# Patient Record
Sex: MALE | Race: WHITE | NOT HISPANIC OR LATINO | Employment: FULL TIME | URBAN - METROPOLITAN AREA
[De-identification: names, ages, dates, MRNs, and addresses within clinical notes are randomized per-mention and may not be internally consistent; named-entity substitution may affect disease eponyms.]

---

## 2017-03-02 ENCOUNTER — TRANSFERRED RECORDS (OUTPATIENT)
Dept: MULTI SPECIALTY CLINIC | Facility: CLINIC | Age: 57
End: 2017-03-02

## 2017-03-02 LAB
CHOLEST SERPL-MCNC: 218 MG/DL (ref 0–199)
CREAT SERPL-MCNC: 1.03 MG/DL (ref 0.73–1.18)
GFR SERPL CREATININE-BSD FRML MDRD: >60 ML/MIN/1.73M2
GLUCOSE SERPL-MCNC: 94 MG/DL (ref 70–100)
HDLC SERPL-MCNC: 51 MG/DL
LDLC SERPL CALC-MCNC: 149 MG/DL (ref 19–130)
POTASSIUM SERPL-SCNC: 4.2 MMOL/L (ref 3.5–5.2)
TRIGL SERPL-MCNC: 92 MG/DL (ref 4–149)

## 2017-08-07 ENCOUNTER — TRANSFERRED RECORDS (OUTPATIENT)
Dept: MULTI SPECIALTY CLINIC | Facility: CLINIC | Age: 57
End: 2017-08-07

## 2020-01-20 ENCOUNTER — OFFICE VISIT (OUTPATIENT)
Dept: FAMILY MEDICINE | Facility: CLINIC | Age: 60
End: 2020-01-20
Payer: COMMERCIAL

## 2020-01-20 ENCOUNTER — TELEPHONE (OUTPATIENT)
Dept: FAMILY MEDICINE | Facility: CLINIC | Age: 60
End: 2020-01-20

## 2020-01-20 VITALS
WEIGHT: 214 LBS | HEIGHT: 71 IN | BODY MASS INDEX: 29.96 KG/M2 | SYSTOLIC BLOOD PRESSURE: 190 MMHG | OXYGEN SATURATION: 97 % | TEMPERATURE: 97.7 F | DIASTOLIC BLOOD PRESSURE: 112 MMHG | HEART RATE: 86 BPM

## 2020-01-20 DIAGNOSIS — Z13.1 SCREENING FOR DIABETES MELLITUS: ICD-10-CM

## 2020-01-20 DIAGNOSIS — Z13.220 SCREENING FOR LIPID DISORDERS: ICD-10-CM

## 2020-01-20 DIAGNOSIS — I10 ESSENTIAL HYPERTENSION: Primary | ICD-10-CM

## 2020-01-20 DIAGNOSIS — I10 ESSENTIAL HYPERTENSION: ICD-10-CM

## 2020-01-20 DIAGNOSIS — Z23 NEED FOR PROPHYLACTIC VACCINATION AND INOCULATION AGAINST INFLUENZA: ICD-10-CM

## 2020-01-20 DIAGNOSIS — Z12.5 SCREENING FOR PROSTATE CANCER: ICD-10-CM

## 2020-01-20 DIAGNOSIS — Z00.00 ROUTINE GENERAL MEDICAL EXAMINATION AT A HEALTH CARE FACILITY: Primary | ICD-10-CM

## 2020-01-20 PROCEDURE — 99213 OFFICE O/P EST LOW 20 MIN: CPT | Mod: 25 | Performed by: NURSE PRACTITIONER

## 2020-01-20 PROCEDURE — 90682 RIV4 VACC RECOMBINANT DNA IM: CPT | Performed by: NURSE PRACTITIONER

## 2020-01-20 PROCEDURE — 90471 IMMUNIZATION ADMIN: CPT | Performed by: NURSE PRACTITIONER

## 2020-01-20 PROCEDURE — 99386 PREV VISIT NEW AGE 40-64: CPT | Mod: 25 | Performed by: NURSE PRACTITIONER

## 2020-01-20 RX ORDER — LOSARTAN POTASSIUM AND HYDROCHLOROTHIAZIDE 12.5; 5 MG/1; MG/1
1 TABLET ORAL DAILY
Qty: 60 TABLET | Refills: 1 | Status: SHIPPED | OUTPATIENT
Start: 2020-01-20 | End: 2020-01-20 | Stop reason: ALTCHOICE

## 2020-01-20 RX ORDER — HYDROCHLOROTHIAZIDE 25 MG/1
25 TABLET ORAL DAILY
Qty: 30 TABLET | Refills: 1 | Status: SHIPPED | OUTPATIENT
Start: 2020-01-20 | End: 2020-01-23

## 2020-01-20 RX ORDER — LOSARTAN POTASSIUM 100 MG/1
100 TABLET ORAL DAILY
Qty: 30 TABLET | Refills: 1 | Status: SHIPPED | OUTPATIENT
Start: 2020-01-20 | End: 2020-01-23

## 2020-01-20 ASSESSMENT — MIFFLIN-ST. JEOR: SCORE: 1799.89

## 2020-01-20 NOTE — TELEPHONE ENCOUNTER
Ordered separately. Ordered higher dose (100 mg and 25 mg). Please break in half for the first few days and follow our plan (increase to full dose after a few days and call if still over 140 / 90).     Thanks.    Darien

## 2020-01-20 NOTE — TELEPHONE ENCOUNTER
S/w pt and advised of Darien's reply below.  Pt states [facility] pharmacy may not be in network and is researching that now.  Advised if [facility] pharmacy cannot be used he may want to call around to see who has the medications.  If another pharmacy has the medication advised pt the rxs can be transferred between pharmacies without involving the clinic.  The new pharmacy would have to call [facility] to transfer the rxs.     Pt states understanding.    Elizabeth MYLES RN  EP Triage

## 2020-01-20 NOTE — TELEPHONE ENCOUNTER
Pt called and states the pharmacy is not able to fill the losartan-hydrochlorothiazide 50-12.5 mg.  Will need 2 separate rxs but pharmacy is not able to fill losartan rx.    S/w Silvio pharmacist at [facility] in [facility] who states all losartan is on back order due to the recall of hyzaar and will be out of the medication by end of day of what they have.    [facility] in [address] just received a shipment of losartan today.  Pt is willing to go there for rx or do you want to change rx?    If doing 2 separate rxs do you want to do higher (double dosing now and pt take 1/2 tab for a couple of days) or stay with the prescribed dosing?    Pharmacy pended.    Pt can be reached at [phone].    Elizabeth MYLES RN  EP Triage

## 2020-01-20 NOTE — PATIENT INSTRUCTIONS
Preventive Health Recommendations  Male Ages 50 - 64    Yearly exam:             See your health care provider every year in order to  o   Review health changes.   o   Discuss preventive care.    o   Review your medicines if your doctor has prescribed any.     Have a cholesterol test every 5 years, or more frequently if you are at risk for high cholesterol/heart disease.     Have a diabetes test (fasting glucose) every three years. If you are at risk for diabetes, you should have this test more often.     Have a colonoscopy at age 50, or have a yearly FIT test (stool test). These exams will check for colon cancer.      Talk with your health care provider about whether or not a prostate cancer screening test (PSA) is right for you.    You should be tested each year for STDs (sexually transmitted diseases), if you re at risk.     Shots: Get a flu shot each year. Get a tetanus shot every 10 years.     Nutrition:    Eat at least 5 servings of fruits and vegetables daily.     Eat whole-grain bread, whole-wheat pasta and brown rice instead of white grains and rice.     Get adequate Calcium and Vitamin D.     Lifestyle    Exercise for at least 150 minutes a week (30 minutes a day, 5 days a week). This will help you control your weight and prevent disease.     Limit alcohol to one drink per day.     No smoking.     Wear sunscreen to prevent skin cancer.     See your dentist every six months for an exam and cleaning.     See your eye doctor every 1 to 2 years.    1. Start losartan-hydrochlorothiazide today. Take one a day for a few days. Move to 2 a day if over 140 / 90.   2. Call after a week or so if still over 140 / 90, and I'll add another agent.   3. Schedule nurse only BP check (possibly at the same time as your labs).

## 2020-01-20 NOTE — PROGRESS NOTES
"  3  SUBJECTIVE:   CC: Serg De Santiago is an 59 year old male who presents for preventive health visit.     Healthy Habits:    Do you get at least three servings of calcium containing foods daily (dairy, green leafy vegetables, etc.)? yes    Amount of exercise or daily activities, outside of work: 0 day(s) per week    Problems taking medications regularly not applicable    Medication side effects: No    Have you had an eye exam in the past two years? no    Do you see a dentist twice per year? yes    Do you have sleep apnea, excessive snoring or daytime drowsiness? yes  Colonoscopy done on this date: 8/7/17 (approximately), by this group: [facility] , results were normal .     PROBLEMS TO ADD ON... switched Ins. Co's - is from [facility] - notes he has had HTN for several years. Has not been on Medications for it, tried life style modifications that he admits he did not see through.   Right ear concerns per audiologist    1. Has been told on numerous occasions that he has high blood pressure. Has some headaches but is otherwise asymptomatic. He has a cuff at home and gets 190s over 110s routinely. Has tried lifestyle modifications to address this, but these have not been effective. Still uses tobacco. Works in furniture sales. Has been told his lipid labs were \"borderline\" in the past. Is trying to work on exercise and diet (with the help of his wife).     Today's PHQ-2 Score:   PHQ-2 ( 1999 Pfizer) 1/20/2020   Q1: Little interest or pleasure in doing things 0   Q2: Feeling down, depressed or hopeless 0   PHQ-2 Score 0       Abuse: Current or Past(Physical, Sexual or Emotional)- NO  Do you feel safe in your environment? YES    Have you ever done Advance Care Planning? (For example, a Health Directive, POLST, or a discussion with a medical provider or your loved ones about your wishes): Yes, patient states has an Advance Care Planning document and will bring a copy to the clinic.    Social History     Tobacco " "Use     Smoking status: Current Every Day Smoker     Types: Vaping Device     Smokeless tobacco: Current User     Types: Chew     Tobacco comment: 0.6 nicotine    Substance Use Topics     Alcohol use: Yes     If you drink alcohol do you typically have >3 drinks per day or >7 drinks per week? No                      Last PSA: No results found for: PSA    Reviewed orders with patient. Reviewed health maintenance and updated orders accordingly - Yes  Lab work is in process    Reviewed and updated as needed this visit by clinical staff  Tobacco  Allergies  Meds  Problems  Med Hx  Surg Hx  Fam Hx  Soc Hx          Reviewed and updated as needed this visit by Provider  Tobacco  Allergies  Meds  Problems  Med Hx  Surg Hx  Fam Hx            ROS:  CONSTITUTIONAL: NEGATIVE for fever, chills, change in weight  INTEGUMENTARY/SKIN: NEGATIVE for worrisome rashes, moles or lesions  EYES: NEGATIVE for vision changes or irritation  ENT: NEGATIVE for ear, mouth and throat problems  RESP: NEGATIVE for significant cough or SOB  CV: NEGATIVE for chest pain, palpitations or peripheral edema  GI: NEGATIVE for nausea, abdominal pain, heartburn, or change in bowel habits   male: negative for dysuria, hematuria, decreased urinary stream, erectile dysfunction, urethral discharge  MUSCULOSKELETAL: NEGATIVE for significant arthralgias or myalgia  NEURO: NEGATIVE for weakness, dizziness or paresthesias  PSYCHIATRIC: NEGATIVE for changes in mood or affect    OBJECTIVE:   BP (!) 190/112   Pulse 86   Temp 97.7  F (36.5  C) (Tympanic)   Ht 1.791 m (5' 10.5\")   Wt 97.1 kg (214 lb)   SpO2 97%   BMI 30.27 kg/m    EXAM:  GENERAL: healthy, alert and no distress  EYES: Eyes grossly normal to inspection, PERRL and conjunctivae and sclerae normal  HENT: ear canals and TM's normal, nose and mouth without ulcers or lesions  NECK: no adenopathy, no asymmetry, masses, or scars and thyroid normal to palpation  RESP: lungs clear to " auscultation - no rales, rhonchi or wheezes  CV: regular rate and rhythm, normal S1 S2, no S3 or S4, no murmur, click or rub, no peripheral edema and peripheral pulses strong  ABDOMEN: soft, nontender, no hepatosplenomegaly, no masses and bowel sounds normal  MS: no gross musculoskeletal defects noted, no edema  SKIN: no suspicious lesions or rashes  NEURO: Normal strength and tone, mentation intact and speech normal  PSYCH: mentation appears normal, affect normal/bright    Diagnostic Test Results:  No results found for this or any previous visit (from the past 24 hour(s)).    ASSESSMENT/PLAN:   Serg was seen today for physical and imm/inj.    Diagnoses and all orders for this visit:    Routine general medical examination at a health care facility  Comment: BP very high x 2 today. With the exception of this issue, he feels well. Will screen lipids and fasting BG today, as well as PSA (no family history of prostate cancer). I will follow up with him after I see these results.     Essential hypertension  Comment: BP quite elevated. He gets some headaches but is otherwise without concerning symptoms. Will order dual agent therapy initially given degree of elevation. He will begin using (and titrating) Hyzaar this week and call if BP is still out of range, at which point I will likely add another agent. He will then return for fasting labs, HTN lab, and nurse only BP check. Will consider referral to cardiology if we are unable to get this into range with 4 or fewer agents. He agrees with this approach.   -     Albumin Random Urine Quantitative with Creat Ratio; Future  -     Basic metabolic panel; Future  -     losartan-hydrochlorothiazide (HYZAAR) 50-12.5 MG tablet; Take 1 tablet by mouth daily    Screening for prostate cancer  -     Prostate spec antigen screen; Future    Screening for diabetes mellitus  -     Basic metabolic panel; Future    Screening for lipid disorders  -     Lipid panel reflex to direct LDL  "Fasting; Future    Need for prophylactic vaccination and inoculation against influenza  -     C RIV4 (FLUBLOK) VACCINE RECOMBINANT DNA PRSRV ANTIBIO FREE, IM [06281]  -     Vaccine Administration, Initial [99383]        COUNSELING:  Reviewed preventive health counseling, as reflected in patient instructions    Estimated body mass index is 30.27 kg/m  as calculated from the following:    Height as of this encounter: 1.791 m (5' 10.5\").    Weight as of this encounter: 97.1 kg (214 lb).    Weight management plan: Discussed healthy diet and exercise guidelines     reports that he has been smoking vaping device. His smokeless tobacco use includes chew.  Tobacco Cessation Action Plan: Information offered: Patient not interested at this time    Counseling Resources:  ATP IV Guidelines  Pooled Cohorts Equation Calculator  FRAX Risk Assessment  ICSI Preventive Guidelines  Dietary Guidelines for Americans, 2010  USDA's MyPlate  ASA Prophylaxis  Lung CA Screening    Reinaldo Gastelum NP  [facility]  "

## 2020-01-20 NOTE — Clinical Note
Please abstract the following data from this visit with this patient into the appropriate field in Epic:Colonoscopy done on this date: 8/7/17 (approximately), by [facility] group: [facility] , results were normal . Tests that can be patient reported without a hard copy:Colonoscopy done on this date: Colonoscopy done on this date: 8/7/17 (approximately), by this group: [facility] , results were normal .  Note to Abstraction: If this section is blank, no results were found via Chart Review/Care Everywhere.

## 2020-01-23 ENCOUNTER — TELEPHONE (OUTPATIENT)
Dept: FAMILY MEDICINE | Facility: CLINIC | Age: 60
End: 2020-01-23

## 2020-01-23 DIAGNOSIS — I10 ESSENTIAL HYPERTENSION: Primary | ICD-10-CM

## 2020-01-23 RX ORDER — HYDROCHLOROTHIAZIDE 25 MG/1
25 TABLET ORAL DAILY
Qty: 30 TABLET | Refills: 1
Start: 2020-01-23 | End: 2020-01-27

## 2020-01-23 RX ORDER — LISINOPRIL AND HYDROCHLOROTHIAZIDE 12.5; 2 MG/1; MG/1
1 TABLET ORAL DAILY
Qty: 60 TABLET | Refills: 1 | Status: SHIPPED | OUTPATIENT
Start: 2020-01-23 | End: 2020-01-23

## 2020-01-23 RX ORDER — LOSARTAN POTASSIUM 100 MG/1
100 TABLET ORAL DAILY
Qty: 30 TABLET | Refills: 1
Start: 2020-01-23 | End: 2020-01-27

## 2020-01-23 NOTE — TELEPHONE ENCOUNTER
Please call and let him know that I ordered an alternative agent.    Same instructions (this should be equivalent dose / effect-wise).    Take one a day for a few days. Graduate to two a day. If still out of range, call and I'll add a third agent. Come back for BP check and labs in a few weeks.     Sorry the original drug is on recall.     Thanks.

## 2020-01-23 NOTE — TELEPHONE ENCOUNTER
Called patients pharmacy to cancel the Lisinopril-hydrochlorothiazide. Pharmacy verbalized understanding and agrees with plan.     Called patient and informed him of NOEMI Gastelum's response below.   Patient picked up the Losartan and Hydrochlorothiazide ([facility] [place], patient has enough medication for both right now and will call back if he needs alternative medication for Losartan if the pharmacy are on back order again.). Patient verbalized understanding and agrees with plan. Patient will follow up next week if blood pressure is still above 140/90.    Kelly Rothman RN, BSN  [facility]

## 2020-01-23 NOTE — TELEPHONE ENCOUNTER
Yes, please.     Sorry, I just got a notice that this was all on backorder.     Please keep me posted on BPs. He is right on the border of his desired range. Call next week if still over 140/90 and I'll add a low dose calcium channel blocker to get him into range.     Thanks.

## 2020-01-23 NOTE — TELEPHONE ENCOUNTER
Losartan of all strengths and combination is on backorder with no estimated time resolution. Please discuss alternative and send RX if appropriate

## 2020-01-23 NOTE — TELEPHONE ENCOUNTER
Returned call to patient.  Patient had BP last night was 141/90, patient did  and take the losartan  on Monday 1/20/2020. Patient had taken a half tablet for  2 days and then increased to a full tablets after 2 days.    Patient is currently taking the losartan 100 mg and 25 mg of hydrochlorothiazide.    Routing back to provider as FYI, do you want triage to [facility] new Rx for lisinopril-hydrochlorothiazide sent today?    MANDIE ParisiN, RN  [employer] Triage

## 2020-01-27 ENCOUNTER — ALLIED HEALTH/NURSE VISIT (OUTPATIENT)
Dept: NURSING | Facility: CLINIC | Age: 60
End: 2020-01-27
Payer: COMMERCIAL

## 2020-01-27 ENCOUNTER — TELEPHONE (OUTPATIENT)
Dept: FAMILY MEDICINE | Facility: CLINIC | Age: 60
End: 2020-01-27

## 2020-01-27 VITALS — SYSTOLIC BLOOD PRESSURE: 142 MMHG | DIASTOLIC BLOOD PRESSURE: 98 MMHG

## 2020-01-27 DIAGNOSIS — Z13.220 SCREENING FOR LIPID DISORDERS: ICD-10-CM

## 2020-01-27 DIAGNOSIS — Z12.5 SCREENING FOR PROSTATE CANCER: ICD-10-CM

## 2020-01-27 DIAGNOSIS — I10 ESSENTIAL HYPERTENSION: ICD-10-CM

## 2020-01-27 DIAGNOSIS — Z13.1 SCREENING FOR DIABETES MELLITUS: ICD-10-CM

## 2020-01-27 DIAGNOSIS — I10 BENIGN ESSENTIAL HYPERTENSION: Primary | ICD-10-CM

## 2020-01-27 LAB
ANION GAP SERPL CALCULATED.3IONS-SCNC: 7 MMOL/L (ref 3–14)
BUN SERPL-MCNC: 20 MG/DL (ref 7–30)
CALCIUM SERPL-MCNC: 9.2 MG/DL (ref 8.5–10.1)
CHLORIDE SERPL-SCNC: 104 MMOL/L (ref 94–109)
CHOLEST SERPL-MCNC: 207 MG/DL
CO2 SERPL-SCNC: 28 MMOL/L (ref 20–32)
CREAT SERPL-MCNC: 1.12 MG/DL (ref 0.66–1.25)
CREAT UR-MCNC: 188 MG/DL
GFR SERPL CREATININE-BSD FRML MDRD: 71 ML/MIN/{1.73_M2}
GLUCOSE SERPL-MCNC: 111 MG/DL (ref 70–99)
HDLC SERPL-MCNC: 58 MG/DL
LDLC SERPL CALC-MCNC: 126 MG/DL
MICROALBUMIN UR-MCNC: 12 MG/L
MICROALBUMIN/CREAT UR: 6.22 MG/G CR (ref 0–17)
NONHDLC SERPL-MCNC: 149 MG/DL
POTASSIUM SERPL-SCNC: 4.1 MMOL/L (ref 3.4–5.3)
SODIUM SERPL-SCNC: 139 MMOL/L (ref 133–144)
TRIGL SERPL-MCNC: 113 MG/DL

## 2020-01-27 PROCEDURE — 80048 BASIC METABOLIC PNL TOTAL CA: CPT | Performed by: NURSE PRACTITIONER

## 2020-01-27 PROCEDURE — 99207 ZZC NO CHARGE NURSE ONLY: CPT

## 2020-01-27 PROCEDURE — G0103 PSA SCREENING: HCPCS | Performed by: NURSE PRACTITIONER

## 2020-01-27 PROCEDURE — 36415 COLL VENOUS BLD VENIPUNCTURE: CPT | Performed by: NURSE PRACTITIONER

## 2020-01-27 PROCEDURE — 80061 LIPID PANEL: CPT | Performed by: NURSE PRACTITIONER

## 2020-01-27 PROCEDURE — 82043 UR ALBUMIN QUANTITATIVE: CPT | Performed by: NURSE PRACTITIONER

## 2020-01-27 RX ORDER — LOSARTAN POTASSIUM 100 MG/1
100 TABLET ORAL DAILY
Qty: 90 TABLET | Refills: 0 | Status: SHIPPED | OUTPATIENT
Start: 2020-01-27 | End: 2020-03-19

## 2020-01-27 RX ORDER — HYDROCHLOROTHIAZIDE 25 MG/1
25 TABLET ORAL DAILY
Qty: 90 TABLET | Refills: 0 | Status: SHIPPED | OUTPATIENT
Start: 2020-01-27 | End: 2020-03-19

## 2020-01-27 RX ORDER — AMLODIPINE BESYLATE 10 MG/1
10 TABLET ORAL DAILY
Qty: 90 TABLET | Refills: 0 | Status: SHIPPED | OUTPATIENT
Start: 2020-01-27 | End: 2020-03-19

## 2020-01-27 NOTE — PROGRESS NOTES
eSrg De Santiago is a 59 year old year old patient who comes in today for a Blood Pressure check because of new medication.  Vital Signs as repeated by CLAUDIA LOMBARDI  Patient is taking medication as prescribed  Patient is tolerating medications well.  Patient is monitoring Blood Pressure at home.  Average readings if yes are 140-150s/90s  Current complaints: none  Disposition:  patient to continue with the same medication, patient reminded to call as needed and message routed to Darien for recommendations.    Pt has been taking the lisinopril and hydrochlorothiazide with no problems and tolerating well.  Has been on the medication since last week.  Pt wondering if should schedule an appt with Darien since he was only given a 30 day supply of the medication?  If needs appt mondays work best for pt.      Pt can be reached at [phone] and ok to leave message.    Elizabeth LOMBARDI. CLAUDIA  EP Triage

## 2020-01-27 NOTE — TELEPHONE ENCOUNTER
Spoke with patient, he is taking Losartan 100 mg every day and Hydrochlorothiazide 25 mg every day. He picked up losartan on Monday (30 day supply). Pharmacy pended for amlodipine rx. Nurse BP check scheduled for next week.   Maegan Zhnag RN   [facility] - Triage

## 2020-01-27 NOTE — TELEPHONE ENCOUNTER
Non detailed message left for pt to return call to clinic and ask to speak with a triage nurse.                  Thank you.     Can you please find out exactly what drugs and dose he is taking? I prescribed him losartan and hydrochlorothiazide, but I got a message from his pharmacy stating that this would be on back order for awhile. I then ordered lisinopril, but I heard that he somehow was taking losartan (not sure how he got this if it's on backorder), so I switched the order back to losartan.     If he is taking losartan 100 and hydrochlorothiazide 25, I will order amlodipine to get him the rest of the way into range (ideally). Please have him come in for BP check in a week or so after he starts this.     Now, if he is taking losartan and it looks like he won't be able to refill this (based on recall / backorder situation), I will have to switch him to lisinopril plus HCTZ. I will have him take 40 mg plus 25 mg hydrochlorothiazide.     I will order refills and add amlodipine after I hear back.     Thanks,     Darien MYLES RN  EP Triage

## 2020-01-28 LAB — PSA SERPL-ACNC: 1.1 UG/L (ref 0–4)

## 2020-02-03 ENCOUNTER — ALLIED HEALTH/NURSE VISIT (OUTPATIENT)
Dept: NURSING | Facility: CLINIC | Age: 60
End: 2020-02-03
Payer: COMMERCIAL

## 2020-02-03 VITALS — DIASTOLIC BLOOD PRESSURE: 90 MMHG | SYSTOLIC BLOOD PRESSURE: 138 MMHG

## 2020-02-03 DIAGNOSIS — Z01.30 BP CHECK: Primary | ICD-10-CM

## 2020-02-03 PROCEDURE — 99207 ZZC NO CHARGE NURSE ONLY: CPT

## 2020-02-03 NOTE — PROGRESS NOTES
Serg De Santiago is a 59 year old year old patient who comes in today for a Blood Pressure check because of new medication, medication change and ongoing blood pressure monitoring.  Vital Signs as repeated by CLAUDIA Mendoza  Patient is taking medication as prescribed  Patient is tolerating medications well.  Patient is monitoring Blood Pressure at home.  Average readings if yes are 133/79, 146/96. 141/98  Current complaints: none  Disposition:  Routing to Provider to advise on this.       Patient started on Amlodipine last Wednesday. The Losartan and the hydrochlorothiazide on 1/20/20. Patient is taking medications as prescribed. Patient has limited supply of the Losartan and hydrochlorothiazide.     BP 1: 140/88  BP 2: 138/90    Okay to leave a detailed message? YES      Kelly Rothman RN, BSN  [facility]

## 2020-02-10 NOTE — PROGRESS NOTES
Called patient and informed him of NP note. Scheduled patient for BP recheck in 4 weeks. Patient verbalized understanding and agrees with plan.     Kelly Rothman RN, BSN  [facility]

## 2020-03-09 ENCOUNTER — OFFICE VISIT (OUTPATIENT)
Dept: NURSING | Facility: CLINIC | Age: 60
End: 2020-03-09
Payer: COMMERCIAL

## 2020-03-09 VITALS — SYSTOLIC BLOOD PRESSURE: 126 MMHG | RESPIRATION RATE: 16 BRPM | HEART RATE: 60 BPM | DIASTOLIC BLOOD PRESSURE: 70 MMHG

## 2020-03-09 DIAGNOSIS — I10 BENIGN ESSENTIAL HYPERTENSION: Primary | ICD-10-CM

## 2020-03-09 PROCEDURE — 99207 ZZC NO CHARGE NURSE ONLY: CPT

## 2020-03-09 RX ORDER — MULTIPLE VITAMINS W/ MINERALS TAB 9MG-400MCG
1 TAB ORAL DAILY
COMMUNITY

## 2020-03-09 NOTE — PROGRESS NOTES
Serg De Santiago is a 59 year old year old patient who comes in today for a Blood Pressure check because of new medication.  Vital Signs as repeated by RN Adamaris Regalado RN    Patient is taking medication as prescribed  Patient is tolerating medications well.  Patient is monitoring Blood Pressure at home.  Average readings if yes are 134/87, this morning 212/71, 124/74  Current complaints: none  Disposition:  patient to continue with the same medication    Blood pressure checked in clinic by Adamaris Regalado RN           9:07 136/72         9:12  126/70    Patient agrees to contact his pharmacy for future renewals or use the [facility] Rx Renewal through [facility].  Adamaris Regalado RN

## 2020-03-09 NOTE — NURSING NOTE
Serg De Santiago is a 59 year old year old patient who comes in today for a Blood Pressure check because of new medication.  Vital Signs as repeated by RN Adamaris Regalado RN    Patient is taking medication as prescribed  Patient is tolerating medications well.  Patient is monitoring Blood Pressure at home.  Average readings if yes are 134/87, this morning 212/71, 124/74  Current complaints: none  Disposition:  patient to continue with the same medication    Blood pressure checked in clinic by Adamaris Regalado RN           9:07 136/72         9:12  126/70    Patient agrees to contact his pharmacy for future renewals or use the [facility] Rx Renewal through [facility].  Adamaris Regalado RN

## 2020-03-19 DIAGNOSIS — I10 ESSENTIAL HYPERTENSION: ICD-10-CM

## 2020-03-19 RX ORDER — LOSARTAN POTASSIUM 100 MG/1
100 TABLET ORAL DAILY
Qty: 90 TABLET | Refills: 2 | Status: SHIPPED | OUTPATIENT
Start: 2020-03-19 | End: 2020-12-30

## 2020-03-19 RX ORDER — HYDROCHLOROTHIAZIDE 25 MG/1
25 TABLET ORAL DAILY
Qty: 30 TABLET | Refills: 0 | Status: SHIPPED | OUTPATIENT
Start: 2020-03-19 | End: 2020-03-19

## 2020-03-19 RX ORDER — AMLODIPINE BESYLATE 10 MG/1
10 TABLET ORAL DAILY
Qty: 90 TABLET | Refills: 2 | Status: SHIPPED | OUTPATIENT
Start: 2020-03-19 | End: 2020-12-30

## 2020-03-19 RX ORDER — LOSARTAN POTASSIUM 100 MG/1
100 TABLET ORAL DAILY
Qty: 30 TABLET | Refills: 0 | Status: SHIPPED | OUTPATIENT
Start: 2020-03-19 | End: 2020-03-19

## 2020-03-19 RX ORDER — HYDROCHLOROTHIAZIDE 25 MG/1
25 TABLET ORAL DAILY
Qty: 90 TABLET | Refills: 2 | Status: SHIPPED | OUTPATIENT
Start: 2020-03-19 | End: 2020-12-30

## 2020-03-19 RX ORDER — AMLODIPINE BESYLATE 10 MG/1
10 TABLET ORAL DAILY
Qty: 30 TABLET | Refills: 0 | Status: SHIPPED | OUTPATIENT
Start: 2020-03-19 | End: 2020-03-19

## 2020-03-19 NOTE — TELEPHONE ENCOUNTER
"Requested Prescriptions   Pending Prescriptions Disp Refills     amLODIPine (NORVASC) 10 MG tablet 90 tablet 0     Sig: Take 1 tablet (10 mg) by mouth daily       Calcium Channel Blockers Protocol  Passed - 3/19/2020  2:16 PM        Passed - Blood pressure under 140/90 in past 12 months     BP Readings from Last 3 Encounters:   03/09/20 126/70   02/03/20 (!) 138/90   01/27/20 (!) 142/98                 Passed - Recent (12 mo) or future (30 days) visit within the authorizing provider's specialty     Patient has had an office visit with the authorizing provider or a provider within the authorizing providers department within the previous 12 mos or has a future within next 30 days. See \"Patient Info\" tab in inbasket, or \"Choose Columns\" in Meds & Orders section of the refill encounter.              Passed - Medication is active on med list        Passed - Patient is age 18 or older        Passed - Normal serum creatinine on file in past 12 months     Recent Labs   Lab Test 01/27/20  1015   CR 1.12       Ok to refill medication if creatinine is low             hydrochlorothiazide (HYDRODIURIL) 25 MG tablet 90 tablet 0     Sig: Take 1 tablet (25 mg) by mouth daily       Diuretics (Including Combos) Protocol Passed - 3/19/2020  2:16 PM        Passed - Blood pressure under 140/90 in past 12 months     BP Readings from Last 3 Encounters:   03/09/20 126/70   02/03/20 (!) 138/90   01/27/20 (!) 142/98                 Passed - Recent (12 mo) or future (30 days) visit within the authorizing provider's specialty     Patient has had an office visit with the authorizing provider or a provider within the authorizing providers department within the previous 12 mos or has a future within next 30 days. See \"Patient Info\" tab in inbasket, or \"Choose Columns\" in Meds & Orders section of the refill encounter.              Passed - Medication is active on med list        Passed - Patient is age 18 or older        Passed - Normal serum " "creatinine on file in past 12 months     Recent Labs   Lab Test 01/27/20  1015   CR 1.12              Passed - Normal serum potassium on file in past 12 months     Recent Labs   Lab Test 01/27/20  1015   POTASSIUM 4.1                    Passed - Normal serum sodium on file in past 12 months     Recent Labs   Lab Test 01/27/20  1015                    losartan (COZAAR) 100 MG tablet 90 tablet 0     Sig: Take 1 tablet (100 mg) by mouth daily       Angiotensin-II Receptors Passed - 3/19/2020  2:16 PM        Passed - Last blood pressure under 140/90 in past 12 months     BP Readings from Last 3 Encounters:   03/09/20 126/70   02/03/20 (!) 138/90   01/27/20 (!) 142/98                 Passed - Recent (12 mo) or future (30 days) visit within the authorizing provider's specialty     Patient has had an office visit with the authorizing provider or a provider within the authorizing providers department within the previous 12 mos or has a future within next 30 days. See \"Patient Info\" tab in inbasket, or \"Choose Columns\" in Meds & Orders section of the refill encounter.              Passed - Medication is active on med list        Passed - Patient is age 18 or older        Passed - Normal serum creatinine on file in past 12 months     Recent Labs   Lab Test 01/27/20  1015   CR 1.12       Ok to refill medication if creatinine is low          Passed - Normal serum potassium on file in past 12 months     Recent Labs   Lab Test 01/27/20  1015   POTASSIUM 4.1                     Detail for amlodipine, losartan and potassium  Last Written Prescription Date:  1/27/20  Last Fill Quantity: 90,  # refills: 1   Last office visit: 1/20/2020 with prescribing provider:  1/20/20   Future Office Visit:    Prescription approved per [facility] Refill Protocol. Both 30 day and 90 day rx sent as requested.   Adamaris Regalado RN    "

## 2020-03-19 NOTE — TELEPHONE ENCOUNTER
Reason for Call:  New 90 day refill request for Amlodipine, Hydrochlorothiazide and losartan [facility]    This is the first time [facility], it is much cheaper than [facility]. He said he requested refills on [employer] website and they emailed him today that the refill would be delayed because they hadn't heard back from us. It doesn't look like we received request from [facility].    He is also down to 1 day left on his medications and would like 30 day supply sent to [facility] in [place].    Best phone number to reach pt at is: [phone]  Ok to leave a message with medical info? yes    Pharmacy preferred (if calling for a refill): 30 day supply to [facility]    90 day supply to [facility]    30 day supply to [facility]    Laurel Godwin  Patient Representative

## 2020-12-30 DIAGNOSIS — I10 ESSENTIAL HYPERTENSION: ICD-10-CM

## 2020-12-30 RX ORDER — LOSARTAN POTASSIUM 100 MG/1
100 TABLET ORAL DAILY
Qty: 90 TABLET | Refills: 0 | Status: SHIPPED | OUTPATIENT
Start: 2020-12-30 | End: 2021-02-08

## 2020-12-30 RX ORDER — AMLODIPINE BESYLATE 10 MG/1
10 TABLET ORAL DAILY
Qty: 90 TABLET | Refills: 0 | Status: SHIPPED | OUTPATIENT
Start: 2020-12-30 | End: 2021-02-08

## 2020-12-30 RX ORDER — HYDROCHLOROTHIAZIDE 25 MG/1
25 TABLET ORAL DAILY
Qty: 90 TABLET | Refills: 0 | Status: SHIPPED | OUTPATIENT
Start: 2020-12-30 | End: 2021-02-08

## 2020-12-30 NOTE — TELEPHONE ENCOUNTER
Patient Contact    Attempt # 1 (tried twice)    Was call answered?  No.  Left message on voicemail with information to call triage back.    On call back:     - Was he told to stop his other medications? Why is he questioning if he stop taking amlodipine and losartan?   - Which pharmacy is he using?

## 2020-12-30 NOTE — TELEPHONE ENCOUNTER
Requested Prescriptions   Pending Prescriptions Disp Refills     amLODIPine (NORVASC) 10 MG tablet 90 tablet 2     Sig: Take 1 tablet (10 mg) by mouth daily  Last Written Prescription Date:  03/19/2020  Last Fill Quantity: 90,  # refills: 2   Last office visit: 1/20/2020 with prescribing provider:  Nirav Nieves Office Visit:                 There is no refill protocol information for this order        losartan (COZAAR) 100 MG tablet 90 tablet 2     Sig: Take 1 tablet (100 mg) by mouth daily  Last Written Prescription Date:  03/19/2020  Last Fill Quantity: 90,  # refills: 2   Last office visit: 1/20/2020 with prescribing provider:  Nirav Nieves Office Visit:                 There is no refill protocol information for this order        hydrochlorothiazide (HYDRODIURIL) 25 MG tablet 90 tablet 2     Sig: Take 1 tablet (25 mg) by mouth daily  Last Written Prescription Date:  03/19/2020  Last Fill Quantity: 90,  # refills: 2   Last office visit: 1/20/2020 with prescribing provider:  Nirav Nieves Office Visit:                 There is no refill protocol information for this order

## 2020-12-30 NOTE — TELEPHONE ENCOUNTER
Patient is calling in requesting refills on the below ordered medications.     .Rena BOOKER    [facility] [place]

## 2020-12-30 NOTE — TELEPHONE ENCOUNTER
Refilled medications. Routing to provider as FYI that he was out of his hydrochlorothiazide for last 7-10 days and was not taking. No action needed from provider, just FYI. [facility]    Called and informed patient that rx were sent to pharmacy.

## 2020-12-30 NOTE — TELEPHONE ENCOUNTER
Called patient.     He has been out of hydrochlorothiazide for 7-10 days. He was questioning if he should continue his losartan and amlodipine if he hasn't been taking hydrochlorothiazide? His BP has been running 130/80.

## 2020-12-30 NOTE — TELEPHONE ENCOUNTER
Yes - advised he is due for physical after 1/20/2021 based on when he had last physical last year. He states he will call back to schedule.

## 2020-12-30 NOTE — TELEPHONE ENCOUNTER
Patient calling in stating that he has been without his Hydrochlorothiazide for 7-10 days and would like to know if he should still be taking his Amlodipine and losartan, please advise.  [phone].    .Rena BOOKER    [facility] [place]

## 2020-12-30 NOTE — TELEPHONE ENCOUNTER
Thank you. Did you let him know that he is due for fasting physical and labs / medication check before this 90 day supply runs out? If so, thank you. If not, please call and remind him. Thanks.    Darien

## 2021-02-08 ENCOUNTER — TELEPHONE (OUTPATIENT)
Dept: FAMILY MEDICINE | Facility: CLINIC | Age: 61
End: 2021-02-08

## 2021-02-08 ENCOUNTER — OFFICE VISIT (OUTPATIENT)
Dept: FAMILY MEDICINE | Facility: CLINIC | Age: 61
End: 2021-02-08
Payer: COMMERCIAL

## 2021-02-08 VITALS
HEART RATE: 60 BPM | BODY MASS INDEX: 28.42 KG/M2 | TEMPERATURE: 97.2 F | DIASTOLIC BLOOD PRESSURE: 82 MMHG | OXYGEN SATURATION: 100 % | HEIGHT: 71 IN | SYSTOLIC BLOOD PRESSURE: 124 MMHG | WEIGHT: 203 LBS

## 2021-02-08 DIAGNOSIS — Z23 NEED FOR VACCINATION: ICD-10-CM

## 2021-02-08 DIAGNOSIS — Z13.220 SCREENING FOR LIPID DISORDERS: ICD-10-CM

## 2021-02-08 DIAGNOSIS — Z13.1 SCREENING FOR DIABETES MELLITUS: ICD-10-CM

## 2021-02-08 DIAGNOSIS — I10 ESSENTIAL HYPERTENSION: ICD-10-CM

## 2021-02-08 DIAGNOSIS — Z00.00 ROUTINE GENERAL MEDICAL EXAMINATION AT A HEALTH CARE FACILITY: Primary | ICD-10-CM

## 2021-02-08 DIAGNOSIS — Z12.5 SCREENING FOR PROSTATE CANCER: ICD-10-CM

## 2021-02-08 LAB
ALBUMIN SERPL-MCNC: 3.8 G/DL (ref 3.4–5)
ALP SERPL-CCNC: 83 U/L (ref 40–150)
ALT SERPL W P-5'-P-CCNC: 26 U/L (ref 0–70)
ANION GAP SERPL CALCULATED.3IONS-SCNC: 6 MMOL/L (ref 3–14)
AST SERPL W P-5'-P-CCNC: 15 U/L (ref 0–45)
BILIRUB SERPL-MCNC: 0.8 MG/DL (ref 0.2–1.3)
BUN SERPL-MCNC: 29 MG/DL (ref 7–30)
CALCIUM SERPL-MCNC: 9.6 MG/DL (ref 8.5–10.1)
CHLORIDE SERPL-SCNC: 109 MMOL/L (ref 94–109)
CHOLEST SERPL-MCNC: 195 MG/DL
CO2 SERPL-SCNC: 27 MMOL/L (ref 20–32)
CREAT SERPL-MCNC: 1.07 MG/DL (ref 0.66–1.25)
CREAT UR-MCNC: 318 MG/DL
GFR SERPL CREATININE-BSD FRML MDRD: 75 ML/MIN/{1.73_M2}
GLUCOSE SERPL-MCNC: 103 MG/DL (ref 70–99)
HDLC SERPL-MCNC: 62 MG/DL
LDLC SERPL CALC-MCNC: 115 MG/DL
MICROALBUMIN UR-MCNC: 17 MG/L
MICROALBUMIN/CREAT UR: 5.41 MG/G CR (ref 0–17)
NONHDLC SERPL-MCNC: 133 MG/DL
POTASSIUM SERPL-SCNC: 3.5 MMOL/L (ref 3.4–5.3)
PROT SERPL-MCNC: 7.3 G/DL (ref 6.8–8.8)
PSA SERPL-ACNC: 1.42 UG/L (ref 0–4)
SODIUM SERPL-SCNC: 142 MMOL/L (ref 133–144)
TRIGL SERPL-MCNC: 92 MG/DL

## 2021-02-08 PROCEDURE — 80061 LIPID PANEL: CPT | Performed by: NURSE PRACTITIONER

## 2021-02-08 PROCEDURE — 36415 COLL VENOUS BLD VENIPUNCTURE: CPT | Performed by: NURSE PRACTITIONER

## 2021-02-08 PROCEDURE — 99396 PREV VISIT EST AGE 40-64: CPT | Mod: 25 | Performed by: NURSE PRACTITIONER

## 2021-02-08 PROCEDURE — G0103 PSA SCREENING: HCPCS | Performed by: NURSE PRACTITIONER

## 2021-02-08 PROCEDURE — 82043 UR ALBUMIN QUANTITATIVE: CPT | Performed by: NURSE PRACTITIONER

## 2021-02-08 PROCEDURE — 90471 IMMUNIZATION ADMIN: CPT | Performed by: NURSE PRACTITIONER

## 2021-02-08 PROCEDURE — 80053 COMPREHEN METABOLIC PANEL: CPT | Performed by: NURSE PRACTITIONER

## 2021-02-08 PROCEDURE — 90750 HZV VACC RECOMBINANT IM: CPT | Performed by: NURSE PRACTITIONER

## 2021-02-08 RX ORDER — LOSARTAN POTASSIUM AND HYDROCHLOROTHIAZIDE 25; 100 MG/1; MG/1
1 TABLET ORAL DAILY
Qty: 90 TABLET | Refills: 3 | Status: SHIPPED | OUTPATIENT
Start: 2021-02-08 | End: 2021-04-02

## 2021-02-08 RX ORDER — AMLODIPINE BESYLATE 10 MG/1
10 TABLET ORAL DAILY
Qty: 90 TABLET | Refills: 3 | Status: SHIPPED | OUTPATIENT
Start: 2021-02-08 | End: 2021-04-02

## 2021-02-08 ASSESSMENT — MIFFLIN-ST. JEOR: SCORE: 1744.99

## 2021-02-08 NOTE — PROGRESS NOTES
SUBJECTIVE:   CC: Serg De Santiago is an 60 year old male who presents for preventative health visit.     Patient has been advised of split billing requirements and indicates understanding: Yes  Healthy Habits:    Getting at least 3 servings of Calcium per day:  Yes    Bi-annual eye exam:  NO    Dental care twice a year:  Yes    Sleep apnea or symptoms of sleep apnea:  Excessive snoring    Diet:  Regular (no restrictions)    Frequency of exercise:  None    Duration of exercise:  N/A    Taking medications regularly:  Yes    Barriers to taking medications:  None    Medication side effects:  None    PHQ-2 Total Score:      PHQ-2 Score:     PHQ-2 ( 1999 Pfizer) 2/8/2021 1/20/2020   Q1: Little interest or pleasure in doing things 0 0   Q2: Feeling down, depressed or hopeless 0 0   PHQ-2 Score 0 0       Hypertension Follow-up      Do you check your blood pressure regularly outside of the clinic? Yes     Are you following a low salt diet? Yes    Are your blood pressures ever more than 140 on the top number (systolic) OR more   than 90 on the bottom number (diastolic), for example 140/90? No      Today's PHQ-2 Score:   PHQ-2 ( 1999 Pfizer) 2/8/2021   Q1: Little interest or pleasure in doing things 0   Q2: Feeling down, depressed or hopeless 0   PHQ-2 Score 0       Abuse: Current or Past(Physical, Sexual or Emotional)- No  Do you feel safe in your environment? Yes        Social History     Tobacco Use     Smoking status: Current Every Day Smoker     Types: Vaping Device     Smokeless tobacco: Current User     Types: Chew     Tobacco comment: 0.6 nicotine    Substance Use Topics     Alcohol use: Yes     If you drink alcohol do you typically have >3 drinks per day or >7 drinks per week? No      Last PSA:   PSA   Date Value Ref Range Status   01/27/2020 1.10 0 - 4 ug/L Final     Comment:     Assay Method:  Chemiluminescence using Siemens Vista analyzer       Reviewed orders with patient. Reviewed health maintenance and  "updated orders accordingly - Yes  Lab work is in process    Reviewed and updated as needed this visit by clinical staff  Tobacco  Allergies  Meds  Problems  Med Hx  Surg Hx  Fam Hx          Reviewed and updated as needed this visit by Provider  Tobacco  Allergies  Meds  Problems  Med Hx  Surg Hx  Fam Hx             Review of Systems  CONSTITUTIONAL: NEGATIVE for fever, chills, change in weight  INTEGUMENTARY/SKIN: NEGATIVE for worrisome rashes, moles or lesions  EYES: NEGATIVE for vision changes or irritation  ENT: NEGATIVE for ear, mouth and throat problems  RESP: NEGATIVE for significant cough or SOB  CV: NEGATIVE for chest pain, palpitations or peripheral edema  GI: NEGATIVE for nausea, abdominal pain, heartburn, or change in bowel habits   male: negative for dysuria, hematuria, decreased urinary stream, erectile dysfunction, urethral discharge  MUSCULOSKELETAL: NEGATIVE for significant arthralgias or myalgia  NEURO: NEGATIVE for weakness, dizziness or paresthesias  PSYCHIATRIC: NEGATIVE for changes in mood or affect    OBJECTIVE:   /82   Pulse 60   Temp 97.2  F (36.2  C) (Tympanic)   Ht 1.791 m (5' 10.5\")   Wt 92.1 kg (203 lb)   SpO2 100%   BMI 28.72 kg/m      Physical Exam  GENERAL: healthy, alert and no distress  EYES: Eyes grossly normal to inspection, PERRL and conjunctivae and sclerae normal  HENT: ear canals and TM's normal, nose and mouth without ulcers or lesions  NECK: no adenopathy, no asymmetry, masses, or scars and thyroid normal to palpation  RESP: lungs clear to auscultation - no rales, rhonchi or wheezes  CV: regular rate and rhythm, normal S1 S2, no S3 or S4, no murmur, click or rub, no peripheral edema and peripheral pulses strong  ABDOMEN: soft, nontender, no hepatosplenomegaly, no masses and bowel sounds normal  MS: no gross musculoskeletal defects noted, no edema  SKIN: no suspicious lesions or rashes  NEURO: Normal strength and tone, mentation intact and speech " "normal  PSYCH: mentation appears normal, affect normal/bright    Diagnostic Test Results:  Labs reviewed in Epic  No results found for this or any previous visit (from the past 24 hour(s)).    ASSESSMENT/PLAN:   Serg was seen today for physical.    Diagnoses and all orders for this visit:    Routine general medical examination at a health care facility    Essential hypertension  -     Albumin Random Urine Quantitative with Creat Ratio  -     Comprehensive metabolic panel  -     losartan-hydrochlorothiazide (HYZAAR) 100-25 MG tablet; Take 1 tablet by mouth daily  -     amLODIPine (NORVASC) 10 MG tablet; Take 1 tablet (10 mg) by mouth daily    Screening for prostate cancer  -     Prostate spec antigen screen    Screening for diabetes mellitus  -     Comprehensive metabolic panel    Screening for lipid disorders  -     Lipid panel reflex to direct LDL Fasting    Need for vaccination  -     SHINGRIX [[id number]]        Patient has been advised of split billing requirements and indicates understanding: Yes  COUNSELING:   Reviewed preventive health counseling, as reflected in patient instructions    Estimated body mass index is 28.72 kg/m  as calculated from the following:    Height as of this encounter: 1.791 m (5' 10.5\").    Weight as of this encounter: 92.1 kg (203 lb).     Weight management plan: Discussed healthy diet and exercise guidelines    He reports that he has been smoking vaping device. His smokeless tobacco use includes chew.  Tobacco Cessation Action Plan:   Information offered: Patient not interested at this time      Counseling Resources:  ATP IV Guidelines  Pooled Cohorts Equation Calculator  FRAX Risk Assessment  ICSI Preventive Guidelines  Dietary Guidelines for Americans, 2010  USDA's MyPlate  ASA Prophylaxis  Lung CA Screening    Reinaldo Gastelum NP  [facility]  "

## 2021-02-08 NOTE — TELEPHONE ENCOUNTER
"----- Message from Reinaldo Gastelum NP sent at 2/8/2021  3:58 PM CST -----  Please call Serg and relate the following:    I have reviewed your labs.    - Your total cholesterol, triglycerides, and HDL (\"good\") cholesterol are normal.     - Your LDL (\"bad) cholesterol is just a shade above desirable but nowhere near where we would need to start a medication. Lifestyle modifications proven to assist with lowering this parameter naturally include: Achieving and maintaining a healthy weight, increasing your activity level (at least 150 minutes of moderate-intensity exercise per week), making sound dietary choices (consuming less saturated fat, more fiber and whole grains, and fewer calories overall), and considering the use of an omega-3 fatty acid supplement (like fish oil).     - Your fasting blood sugar is over 100 mg/dl. This result falls within the category of \"impaired glucose tolerance\" or \"prediabetes\" (100-125 mg/dl). What this means is that you could be at increased risk of developing Type 2 Diabetes in the future (if this continues to rise). Lifestyle modifications proven to assist with preventing progression to diabetes include: Achieving and maintaining a healthy weight, increasing your activity level (at least 150 minutes of moderate-intensity exercise per week), and making sound dietary choices including reduction of dietary carbohydrates (especially simple carbohydrates like white bread, pastries, sweets, soda / juice, potatoes, pasta, rice, etc.).     - Your liver function, kidney function, and electrolytes are normal.    - Your PSA (prostate cancer screening lab) is normal.     Great seeing you in clinic. Reach out with questions or concerns.     Thanks,    Darien Gastelum, EDGARDO, CNP    "

## 2021-02-19 NOTE — TELEPHONE ENCOUNTER
Patient given result message from Reinaldo Gastelum NP. Patient verbalizes understanding and agrees with plan. Adamaris Regalado RN

## 2021-03-31 DIAGNOSIS — I10 ESSENTIAL HYPERTENSION: ICD-10-CM

## 2021-03-31 RX ORDER — AMLODIPINE BESYLATE 10 MG/1
10 TABLET ORAL DAILY
Qty: 90 TABLET | Refills: 3 | OUTPATIENT
Start: 2021-03-31

## 2021-03-31 RX ORDER — LOSARTAN POTASSIUM AND HYDROCHLOROTHIAZIDE 25; 100 MG/1; MG/1
1 TABLET ORAL DAILY
Qty: 90 TABLET | Refills: 3 | OUTPATIENT
Start: 2021-03-31

## 2021-03-31 NOTE — TELEPHONE ENCOUNTER
Refused; Medication just refilled on 2-8-21 with 90 and 3. Too soon for refill request.   Zeynep Lentz RN

## 2021-04-02 ENCOUNTER — TELEPHONE (OUTPATIENT)
Dept: FAMILY MEDICINE | Facility: CLINIC | Age: 61
End: 2021-04-02

## 2021-04-02 DIAGNOSIS — I10 ESSENTIAL HYPERTENSION: ICD-10-CM

## 2021-04-02 RX ORDER — AMLODIPINE BESYLATE 10 MG/1
10 TABLET ORAL DAILY
Qty: 2 TABLET | Refills: 0 | Status: SHIPPED | OUTPATIENT
Start: 2021-04-02 | End: 2022-03-22

## 2021-04-02 RX ORDER — LOSARTAN POTASSIUM AND HYDROCHLOROTHIAZIDE 25; 100 MG/1; MG/1
1 TABLET ORAL DAILY
Qty: 2 TABLET | Refills: 0 | Status: SHIPPED | OUTPATIENT
Start: 2021-04-02 | End: 2022-03-22

## 2021-04-02 NOTE — TELEPHONE ENCOUNTER
Pt calling states [facility] does not have his rxs for amlodipine and losartan-hydrochlorothiazide.  States when he goes on to the website they have 3 meds listed and not just 2.    Nurse called [facility] and they did have the 2 rxs that were escribed on 2/8/21 but were in a holding pattern.  [facility] will [facility]xs today and will take 3-5 days for pt to receive them.  States pt will need a short term rx sent to local pharmacy.    Advised pt of the above and pt states he has enough medication until Thursday 4/8.  2 pills of both amlodipine and losartan/hctz called to [facility] pharmacy.    Elizabeth MYLES RN  EP Triage

## 2021-04-02 NOTE — TELEPHONE ENCOUNTER
Medication Question or Refill    Who is calling: PT     What medication are you calling about (include dose and sig)?: Last time Pt was in the Medicaine was changed, 3 med's in 1 pills. He saw Nirav.   Pt would like a call back to go over this issue       Controlled Substance Agreement on file:     Who prescribed the medication?:     Do you need a refill? Yes:     When did you use the medication last? Daily     Patient offered an appointment? Yes:     Do you have any questions or concerns?  Yes:     Requested Pharmacy:     Okay to leave a detailed message?: Yes at Cell number on file:    Telephone Information:   Mobile [phone]

## 2021-04-08 NOTE — TELEPHONE ENCOUNTER
Gurinder CAVAZOST with [facility] following up. Requesting prescription for losartan and hydrochlorothiazide. She states that losartan hydrochlorothiazide has shipped on 4/5. Verbalized understanding that same medication ordered as single tablet. Adamaris Regalado RN

## 2021-04-12 ENCOUNTER — ALLIED HEALTH/NURSE VISIT (OUTPATIENT)
Dept: NURSING | Facility: CLINIC | Age: 61
End: 2021-04-12
Payer: COMMERCIAL

## 2021-04-12 DIAGNOSIS — Z23 NEED FOR VACCINATION: Primary | ICD-10-CM

## 2021-04-12 PROCEDURE — 99207 PR NO CHARGE LOS: CPT

## 2021-04-12 PROCEDURE — 90750 HZV VACC RECOMBINANT IM: CPT

## 2021-04-12 PROCEDURE — 90471 IMMUNIZATION ADMIN: CPT

## 2022-02-14 ENCOUNTER — OFFICE VISIT (OUTPATIENT)
Dept: FAMILY MEDICINE | Facility: CLINIC | Age: 62
End: 2022-02-14
Payer: COMMERCIAL

## 2022-02-14 VITALS
DIASTOLIC BLOOD PRESSURE: 88 MMHG | RESPIRATION RATE: 18 BRPM | HEART RATE: 68 BPM | BODY MASS INDEX: 28.77 KG/M2 | HEIGHT: 70 IN | TEMPERATURE: 97.9 F | WEIGHT: 201 LBS | OXYGEN SATURATION: 100 % | SYSTOLIC BLOOD PRESSURE: 132 MMHG

## 2022-02-14 DIAGNOSIS — Z12.5 SCREENING FOR PROSTATE CANCER: ICD-10-CM

## 2022-02-14 DIAGNOSIS — Z00.00 HEALTH MAINTENANCE EXAMINATION: Primary | ICD-10-CM

## 2022-02-14 DIAGNOSIS — I10 ESSENTIAL HYPERTENSION: ICD-10-CM

## 2022-02-14 DIAGNOSIS — G47.33 OSA (OBSTRUCTIVE SLEEP APNEA): ICD-10-CM

## 2022-02-14 LAB
ALBUMIN SERPL-MCNC: 3.7 G/DL (ref 3.4–5)
ALP SERPL-CCNC: 83 U/L (ref 40–150)
ALT SERPL W P-5'-P-CCNC: 34 U/L (ref 0–70)
ANION GAP SERPL CALCULATED.3IONS-SCNC: 5 MMOL/L (ref 3–14)
AST SERPL W P-5'-P-CCNC: 14 U/L (ref 0–45)
BILIRUB SERPL-MCNC: 0.7 MG/DL (ref 0.2–1.3)
BUN SERPL-MCNC: 27 MG/DL (ref 7–30)
CALCIUM SERPL-MCNC: 9 MG/DL (ref 8.5–10.1)
CHLORIDE BLD-SCNC: 108 MMOL/L (ref 94–109)
CHOLEST SERPL-MCNC: 181 MG/DL
CO2 SERPL-SCNC: 27 MMOL/L (ref 20–32)
CREAT SERPL-MCNC: 0.98 MG/DL (ref 0.66–1.25)
CREAT UR-MCNC: 87 MG/DL
ERYTHROCYTE [DISTWIDTH] IN BLOOD BY AUTOMATED COUNT: 12.7 % (ref 10–15)
FASTING STATUS PATIENT QL REPORTED: YES
GFR SERPL CREATININE-BSD FRML MDRD: 88 ML/MIN/1.73M2
GLUCOSE BLD-MCNC: 99 MG/DL (ref 70–99)
HCT VFR BLD AUTO: 43.4 % (ref 40–53)
HDLC SERPL-MCNC: 63 MG/DL
HGB BLD-MCNC: 15.1 G/DL (ref 13.3–17.7)
LDLC SERPL CALC-MCNC: 110 MG/DL
MCH RBC QN AUTO: 32.6 PG (ref 26.5–33)
MCHC RBC AUTO-ENTMCNC: 34.8 G/DL (ref 31.5–36.5)
MCV RBC AUTO: 94 FL (ref 78–100)
MICROALBUMIN UR-MCNC: <5 MG/L
MICROALBUMIN/CREAT UR: NORMAL MG/G{CREAT}
NONHDLC SERPL-MCNC: 118 MG/DL
PLATELET # BLD AUTO: 354 10E3/UL (ref 150–450)
POTASSIUM BLD-SCNC: 3.7 MMOL/L (ref 3.4–5.3)
PROT SERPL-MCNC: 7.3 G/DL (ref 6.8–8.8)
PSA SERPL-MCNC: 1.55 UG/L (ref 0–4)
RBC # BLD AUTO: 4.63 10E6/UL (ref 4.4–5.9)
SODIUM SERPL-SCNC: 140 MMOL/L (ref 133–144)
TRIGL SERPL-MCNC: 41 MG/DL
WBC # BLD AUTO: 6.4 10E3/UL (ref 4–11)

## 2022-02-14 PROCEDURE — 82043 UR ALBUMIN QUANTITATIVE: CPT | Performed by: FAMILY MEDICINE

## 2022-02-14 PROCEDURE — 80061 LIPID PANEL: CPT | Performed by: FAMILY MEDICINE

## 2022-02-14 PROCEDURE — 80053 COMPREHEN METABOLIC PANEL: CPT | Performed by: FAMILY MEDICINE

## 2022-02-14 PROCEDURE — 36415 COLL VENOUS BLD VENIPUNCTURE: CPT | Performed by: FAMILY MEDICINE

## 2022-02-14 PROCEDURE — 85027 COMPLETE CBC AUTOMATED: CPT | Performed by: FAMILY MEDICINE

## 2022-02-14 PROCEDURE — G0103 PSA SCREENING: HCPCS | Performed by: FAMILY MEDICINE

## 2022-02-14 PROCEDURE — 99396 PREV VISIT EST AGE 40-64: CPT | Performed by: FAMILY MEDICINE

## 2022-02-14 ASSESSMENT — ENCOUNTER SYMPTOMS
JOINT SWELLING: 0
EYE PAIN: 0
PARESTHESIAS: 0
ABDOMINAL PAIN: 0
WEAKNESS: 0
DYSURIA: 0
CHILLS: 0
HEMATURIA: 0
CONSTIPATION: 0
SORE THROAT: 0
DIZZINESS: 0
MYALGIAS: 0
HEMATOCHEZIA: 0
PALPITATIONS: 0
HEARTBURN: 0
SHORTNESS OF BREATH: 0
FREQUENCY: 0
ARTHRALGIAS: 0
DIARRHEA: 0
COUGH: 0
NERVOUS/ANXIOUS: 0
HEADACHES: 0
NAUSEA: 0
FEVER: 0

## 2022-02-14 ASSESSMENT — MIFFLIN-ST. JEOR: SCORE: 1722.98

## 2022-02-14 ASSESSMENT — PAIN SCALES - GENERAL: PAINLEVEL: NO PAIN (0)

## 2022-02-14 NOTE — PROGRESS NOTES
SUBJECTIVE:   CC: Serg De Santiago is an 61 year old male who presents for preventative health visit.       Patient has been advised of split billing requirements and indicates understanding: Yes  Healthy Habits:     Getting at least 3 servings of Calcium per day:  Yes    Bi-annual eye exam:  NO    Dental care twice a year:  Yes    Sleep apnea or symptoms of sleep apnea:  Excessive snoring and Sleep apnea    Diet:  Regular (no restrictions)    Frequency of exercise:  1 day/week    Duration of exercise:  Less than 15 minutes    Taking medications regularly:  Yes    Medication side effects:  None    PHQ-2 Total Score: 0    Additional concerns today:  No    Patient would like bilateral ear wash     Rash on chest - 1 week duration. Itching and irritated at first now it feels like it is not there but is.         Today's PHQ-2 Score:   PHQ-2 ( 1999 Pfizer) 2/14/2022   Q1: Little interest or pleasure in doing things -   Q2: Feeling down, depressed or hopeless -   PHQ-2 Score -   PHQ-2 Total Score (12-17 Years)- Positive if 3 or more points; Administer PHQ-A if positive -   PHQ-2 Score Incomplete       Abuse: Current or Past(Physical, Sexual or Emotional)- No  Do you feel safe in your environment? Yes        Social History     Tobacco Use     Smoking status: Current Every Day Smoker     Types: Vaping Device     Smokeless tobacco: Current User     Types: Chew     Tobacco comment: 0.6 nicotine    Substance Use Topics     Alcohol use: Yes     If you drink alcohol do you typically have >3 drinks per day or >7 drinks per week? No    No flowsheet data found.    Last PSA:   PSA   Date Value Ref Range Status   02/08/2021 1.42 0 - 4 ug/L Final     Comment:     Assay Method:  Chemiluminescence using Siemens Vista analyzer       Reviewed orders with patient. Reviewed health maintenance and updated orders accordingly - Yes  BP Readings from Last 3 Encounters:   02/14/22 132/88   02/08/21 124/82   03/09/20 126/70    Wt Readings from  Last 3 Encounters:   02/14/22 91.2 kg (201 lb)   02/08/21 92.1 kg (203 lb)   01/20/20 97.1 kg (214 lb)                  There is no problem list on file for this patient.    History reviewed. No pertinent surgical history.    Social History     Tobacco Use     Smoking status: Current Every Day Smoker     Types: Vaping Device     Smokeless tobacco: Current User     Types: Chew     Tobacco comment: 0.6 nicotine    Substance Use Topics     Alcohol use: Yes     History reviewed. No pertinent family history.      Current Outpatient Medications   Medication Sig Dispense Refill     amLODIPine (NORVASC) 10 MG tablet Take 1 tablet (10 mg) by mouth daily 2 tablet 0     losartan-hydrochlorothiazide (HYZAAR) 100-25 MG tablet Take 1 tablet by mouth daily 2 tablet 0     multivitamin w/minerals (MULTI-VITAMIN) tablet Take 1 tablet by mouth daily       No Known Allergies  Recent Labs   Lab Test 02/08/21  0744 01/27/20  1015 03/02/17  0000   * 126* 149*   HDL 62 58 51   TRIG 92 113 92   ALT 26  --   --    CR 1.07 1.12 1.03   GFRESTIMATED 75 71 >60   GFRESTBLACK 87 83 >60   POTASSIUM 3.5 4.1 4.2        Reviewed and updated as needed this visit by clinical staff   Allergies  Meds             Reviewed and updated as needed this visit by Provider               History reviewed. No pertinent past medical history.   History reviewed. No pertinent surgical history.    Review of Systems   Constitutional: Negative for chills and fever.   HENT: Positive for hearing loss. Negative for congestion, ear pain and sore throat.    Eyes: Negative for pain and visual disturbance.   Respiratory: Negative for cough and shortness of breath.    Cardiovascular: Negative for chest pain, palpitations and peripheral edema.   Gastrointestinal: Negative for abdominal pain, constipation, diarrhea, heartburn, hematochezia and nausea.   Genitourinary: Negative for dysuria, frequency, genital sores, hematuria, impotence, penile discharge and urgency.  "  Musculoskeletal: Negative for arthralgias, joint swelling and myalgias.   Skin: Positive for rash.   Neurological: Negative for dizziness, weakness, headaches and paresthesias.   Psychiatric/Behavioral: Negative for mood changes. The patient is not nervous/anxious.          OBJECTIVE:   /88   Pulse 68   Temp 97.9  F (36.6  C) (Tympanic)   Resp 18   Ht 1.778 m (5' 10\")   Wt 91.2 kg (201 lb)   SpO2 100%   BMI 28.84 kg/m      Physical Exam  GENERAL: healthy, alert and no distress  EYES: Eyes grossly normal to inspection, PERRL and conjunctivae and sclerae normal  HENT: ear canals and TM's normal, nose and mouth without ulcers or lesions  NECK: no adenopathy, no asymmetry, masses, or scars and thyroid normal to palpation  RESP: lungs clear to auscultation - no rales, rhonchi or wheezes  CV: regular rate and rhythm, normal S1 S2, no S3 or S4, no murmur, click or rub, no peripheral edema and peripheral pulses strong  ABDOMEN: soft, nontender, no hepatosplenomegaly, no masses and bowel sounds normal  MS: no gross musculoskeletal defects noted, no edema  SKIN: no suspicious lesions or rashes  NEURO: Normal strength and tone, mentation intact and speech normal  BACK: no CVA tenderness, no paralumbar tenderness  PSYCH: mentation appears normal, affect normal/bright  LYMPH: no cervical, supraclavicular, axillary, or inguinal adenopathy        ASSESSMENT/PLAN:       ICD-10-CM    1. Health maintenance examination  Z00.00 REVIEW OF HEALTH MAINTENANCE PROTOCOL ORDERS     CBC with platelets     Comprehensive metabolic panel (BMP + Alb, Alk Phos, ALT, AST, Total. Bili, TP)     Lipid panel reflex to direct LDL Fasting     Albumin Random Urine Quantitative with Creat Ratio     PSA, screen     SLEEP EVALUATION & MANAGEMENT REFERRAL - ADULT -   2. Screening for prostate cancer  Z12.5 PSA, screen   3. Essential hypertension  I10 Comprehensive metabolic panel (BMP + Alb, Alk Phos, ALT, AST, Total. Bili, TP)     Lipid panel " "reflex to direct LDL Fasting     Albumin Random Urine Quantitative with Creat Ratio   4. ORALIA (obstructive sleep apnea)  G47.33 SLEEP EVALUATION & MANAGEMENT REFERRAL - ADULT -       Patient has been advised of split billing requirements and indicates understanding: Yes    COUNSELING:   Reviewed preventive health counseling, as reflected in patient instructions    Estimated body mass index is 28.72 kg/m  as calculated from the following:    Height as of 2/8/21: 1.791 m (5' 10.5\").    Weight as of 2/8/21: 92.1 kg (203 lb).     Weight management plan: Discussed healthy diet and exercise guidelines    He reports that he has been smoking vaping device. His smokeless tobacco use includes chew.  Tobacco Cessation Action Plan:   Information offered: Patient not interested at this time      Counseling Resources:  ATP IV Guidelines  Pooled Cohorts Equation Calculator  FRAX Risk Assessment  ICSI Preventive Guidelines  Dietary Guidelines for Americans, 2010  USDA's MyPlate  ASA Prophylaxis  Lung CA Screening    Max Gamino MD  [facility]  "

## 2022-02-14 NOTE — LETTER
February 14, 2022      Serg De Santiago  [address]  [place] [address]        Dear ,    We are writing to inform you of your test results.    You maybe able to check the lab results via [facility], it showed your lab results including prostate specific antigen and urine microalbumin/complete blood cell counts/electrolyte balance and glucose/liver and kidney function were all normal.   Your cholesterol indexes are slightly elevated as was last time. Please continue working on life style modification including low fat/carb diet with regular exercise.       Resulted Orders   CBC with platelets   Result Value Ref Range    WBC Count 6.4 4.0 - 11.0 10e3/uL    RBC Count 4.63 4.40 - 5.90 10e6/uL    Hemoglobin 15.1 13.3 - 17.7 g/dL    Hematocrit 43.4 40.0 - 53.0 %    MCV 94 78 - 100 fL    MCH 32.6 26.5 - 33.0 pg    MCHC 34.8 31.5 - 36.5 g/dL    RDW 12.7 10.0 - 15.0 %    Platelet Count 354 150 - 450 10e3/uL   Comprehensive metabolic panel (BMP + Alb, Alk Phos, ALT, AST, Total. Bili, TP)   Result Value Ref Range    Sodium 140 133 - 144 mmol/L    Potassium 3.7 3.4 - 5.3 mmol/L    Chloride 108 94 - 109 mmol/L    Carbon Dioxide (CO2) 27 20 - 32 mmol/L    Anion Gap 5 3 - 14 mmol/L    Urea Nitrogen 27 7 - 30 mg/dL    Creatinine 0.98 0.66 - 1.25 mg/dL    Calcium 9.0 8.5 - 10.1 mg/dL    Glucose 99 70 - 99 mg/dL    Alkaline Phosphatase 83 40 - 150 U/L    AST 14 0 - 45 U/L    ALT 34 0 - 70 U/L    Protein Total 7.3 6.8 - 8.8 g/dL    Albumin 3.7 3.4 - 5.0 g/dL    Bilirubin Total 0.7 0.2 - 1.3 mg/dL    GFR Estimate 88 >60 mL/min/1.73m2      Comment:      Effective December 21, 2021 eGFRcr in adults is calculated using the 2021 CKD-EPI creatinine equation which includes age and gender (Guillermo manzanares al., NEJM, DOI: 10.1056/UNFDsw7168305)   Lipid panel reflex to direct LDL Fasting   Result Value Ref Range    Cholesterol 181 <200 mg/dL    Triglycerides 41 <150 mg/dL    Direct Measure HDL 63 >=40 mg/dL    LDL  Cholesterol Calculated 110 (H) <=100 mg/dL    Non HDL Cholesterol 118 <130 mg/dL    Patient Fasting > 8hrs? Yes     Narrative    Cholesterol  Desirable:  <200 mg/dL    Triglycerides  Normal:  Less than 150 mg/dL  Borderline High:  150-199 mg/dL  High:  200-499 mg/dL  Very High:  Greater than or equal to 500 mg/dL    Direct Measure HDL  Female:  Greater than or equal to 50 mg/dL   Male:  Greater than or equal to 40 mg/dL    LDL Cholesterol  Desirable:  <100mg/dL  Above Desirable:  100-129 mg/dL   Borderline High:  130-159 mg/dL   High:  160-189 mg/dL   Very High:  >= 190 mg/dL    Non HDL Cholesterol  Desirable:  130 mg/dL  Above Desirable:  130-159 mg/dL  Borderline High:  160-189 mg/dL  High:  190-219 mg/dL  Very High:  Greater than or equal to 220 mg/dL   Albumin Random Urine Quantitative with Creat Ratio   Result Value Ref Range    Creatinine Urine mg/dL 87 mg/dL    Albumin Urine mg/L <5 mg/L    Albumin Urine mg/g Cr        Comment:      Unable to calculate:  Urine creatinine or albumin value below detectable level   PSA, screen   Result Value Ref Range    Prostate Specific Antigen Screen 1.55 0.00 - 4.00 ug/L       If you have any questions or concerns, please call the clinic at the number listed above.       Sincerely,      Max Gamino MD

## 2022-03-17 DIAGNOSIS — I10 ESSENTIAL HYPERTENSION: ICD-10-CM

## 2022-03-17 NOTE — TELEPHONE ENCOUNTER
Medication Question or Refill    Who is calling: pt walk in to the clinic and stated [facility] the sent request but still waiting for response     What medication are you calling about (include dose and sig)?: losartan-hydrochlorothiazide (HYZAAR) 100-25 MG tablet, amLODIPine (NORVASC) 10 MG tablet    Controlled Substance Agreement on file: No    Who prescribed the medication?: it was Darien but put do Physical with Dr. Gamino after Darien left     Do you need a refill? Yes:     When did you use the medication last? Till using every morning     Patient offered an appointment? No    Do you have any questions or concerns?  NO    [facility]ed [facility]     Okay to leave a detailed message?: Yes at Cell number on file:    Telephone Information:   Mobile [phone]

## 2022-03-22 RX ORDER — AMLODIPINE BESYLATE 10 MG/1
10 TABLET ORAL DAILY
Qty: 90 TABLET | Refills: 2 | Status: SHIPPED | OUTPATIENT
Start: 2022-03-22 | End: 2022-12-08

## 2022-03-22 RX ORDER — LOSARTAN POTASSIUM AND HYDROCHLOROTHIAZIDE 25; 100 MG/1; MG/1
1 TABLET ORAL DAILY
Qty: 90 TABLET | Refills: 2 | Status: SHIPPED | OUTPATIENT
Start: 2022-03-22 | End: 2022-12-08

## 2022-03-22 NOTE — TELEPHONE ENCOUNTER
Prescription approved per [facility] Refill Protocol.    Elizabeth MYLES RN  EP Triage

## 2022-05-09 ENCOUNTER — OFFICE VISIT (OUTPATIENT)
Dept: SLEEP MEDICINE | Facility: CLINIC | Age: 62
End: 2022-05-09
Payer: COMMERCIAL

## 2022-05-09 VITALS
WEIGHT: 204 LBS | SYSTOLIC BLOOD PRESSURE: 132 MMHG | BODY MASS INDEX: 29.2 KG/M2 | OXYGEN SATURATION: 98 % | HEART RATE: 67 BPM | HEIGHT: 70 IN | DIASTOLIC BLOOD PRESSURE: 88 MMHG

## 2022-05-09 DIAGNOSIS — R29.818 SUSPECTED SLEEP APNEA: Primary | ICD-10-CM

## 2022-05-09 DIAGNOSIS — R06.83 SNORING: ICD-10-CM

## 2022-05-09 DIAGNOSIS — I10 PRIMARY HYPERTENSION: ICD-10-CM

## 2022-05-09 DIAGNOSIS — R06.81 WITNESSED APNEIC SPELLS: ICD-10-CM

## 2022-05-09 PROCEDURE — 99203 OFFICE O/P NEW LOW 30 MIN: CPT | Performed by: INTERNAL MEDICINE

## 2022-05-09 NOTE — NURSING NOTE
"Chief Complaint   Patient presents with     Sleep Problem     Snoring, Stop breathing when sleeping, Hypertension       Initial /88   Pulse 67   Ht 1.778 m (5' 10\")   Wt 92.5 kg (204 lb)   SpO2 98%   BMI 29.27 kg/m   Estimated body mass index is 29.27 kg/m  as calculated from the following:    Height as of this encounter: 1.778 m (5' 10\").    Weight as of this encounter: 92.5 kg (204 lb).    Medication Reconciliation: complete  ESS 5  Neck circumference: 42 centimeters.  Kami Banks, GERRI  "

## 2022-05-09 NOTE — PATIENT INSTRUCTIONS
"      MY TREATMENT INFORMATION FOR SLEEP APNEA-  Serg De Santiago    DOCTOR : Enmanuel Butterfield MD, MD    Am I having a sleep study at a sleep center?  --->Due to normal delays, you will be contacted within 2-4 weeks to schedule    Am I having a home sleep study?  --->Watch the video for the device you are using:    -/drop off device-   https://www.mig33.com/watch?v=yGGFBdELGhk    Frequently asked questions:  1. What is Obstructive Sleep Apnea (ORALIA)? ORALIA is the most common type of sleep apnea. Apnea means, \"without breath.\"  Apnea is most often caused by narrowing or collapse of the upper airway as muscles relax during sleep.   Almost everyone has occasional apneas. Most people with sleep apnea have had brief interruptions at night frequently for many years.  The severity of sleep apnea is related to how frequent and severe the events are.   2. What are the consequences of ORALIA? Symptoms include: feeling sleepy during the day, snoring loudly, gasping or stopping of breathing, trouble sleeping, and occasionally morning headaches or heartburn at night.  Sleepiness can be serious and even increase the risk of falling asleep while driving. Other health consequences may include development of high blood pressure and other cardiovascular disease in persons who are susceptible. Untreated ORALIA  can contribute to heart disease, stroke and diabetes.   3. What are the treatment options? In most situations, sleep apnea is a lifelong disease that must be managed with daily therapy. Medications are not effective for sleep apnea and surgery is generally not considered until other therapies have been tried. Your treatment is your choice . Continuous Positive Airway (CPAP) works right away and is the therapy that is effective in nearly everyone. An oral device to hold your jaw forward is usually the next most reliable option. Other options include postioning devices (to keep you off your back), weight loss, and surgery " including a tongue pacing device. There is more detail about some of these options below.  4. Are my sleep studies covered by insurance? Although we will request verification of coverage, we advise you also check in advance of the study to ensure there is coverage.    Important tips for those choosing CPAP and similar devices   Know your equipment:  CPAP is continuous positive airway pressure that prevents obstructive sleep apnea by keeping the throat from collapsing while you are sleeping. In most cases, the device is  smart  and can slowly self-adjusts if your throat collapses and keeps a record every day of how well you are treated-this information is available to you and your care team.  BPAP is bilevel positive airway pressure that keeps your throat open and also assists each breath with a pressure boost to maintain adequate breathing.  Special kinds of BPAP are used in patients who have inadequate breathing from lung or heart disease. In most cases, the device is  smart  and can slowly self-adjusts to assist breathing. Like CPAP, the device keeps a record of how well you are treated.  Your mask is your connection to the device. You get to choose what feels most comfortable and the staff will help to make sure if fits. Here: are some examples of the different masks that are available:       Key points to remember on your journey with sleep apnea:  Sleep study.  PAP devices often need to be adjusted during a sleep study to show that they are effective and adjusted right.  Good tips to remember: Try wearing just the mask during a quiet time during the day so your body adapts to wearing it. A humidifier is recommended for comfort in most cases to prevent drying of your nose and throat. Allergy medication from your provider may help you if you are having nasal congestion.  Getting settled-in. It takes more than one night for most of us to get used to wearing a mask. Try wearing just the mask during a quiet time  during the day so your body adapts to wearing it. A humidifier is recommended for comfort in most cases. Our team will work with you carefully on the first day and will be in contact within 4 days and again at 2 and 4 weeks for advice and remote device adjustments. Your therapy is evaluated by the device each day.   Use it every night. The more you are able to sleep naturally for 7-8 hours, the more likely you will have good sleep and to prevent health risks or symptoms from sleep apnea. Even if you use it 4 hours it helps. Occasionally all of us are unable to use a medical therapy, in sleep apnea, it is not dangerous to miss one night.   Communicate. Call our skilled team on the number provided on the first day if your visit for problems that make it difficult to wear the device. Over 2 out of 3 patients can learn to wear the device long-term with help from our team. Remember to call our team or your sleep providers if you are unable to wear the device as we may have other solutions for those who cannot adapt to mask CPAP therapy. It is recommended that you sleep your sleep provider within the first 3 months and yearly after that if you are not having problems.   Use it for your health. We encourage use of CPAP masks during daytime quiet periods to allow your face and brain to adapt to the sensation of CPAP so that it will be a more natural sensation to awaken to at night or during naps. This can be very useful during the first few weeks or months of adapting to CPAP though it does not help medically to wear CPAP during wakefulness and  should not be used as a strategy just to meet guidelines.  Take care of your equipment. Make sure you clean your mask and tubing using directions every day and that your filter and mask are replaced as recommended or if they are not working.     BESIDES CPAP, WHAT OTHER THERAPIES ARE THERE?    Positioning Device  Positioning devices are generally used when sleep apnea is mild and  only occurs on your back.This example shows a pillow that straps around the waist. It may be appropriate for those whose sleep study shows milder sleep apnea that occurs primarily when lying flat on one's back. Preliminary studies have shown benefit but effectiveness at home may need to be verified by a home sleep test. These devices are generally not covered by medical insurance.  Examples of devices that maintain sleeping on the back to prevent snoring and mild sleep apnea.    Belt type body positioner  http://Authentic8.AkeLex/    Electronic reminder  http://nightshifttherapy.com/  http://www.CoverItLive.AkeLex.au/      Oral Appliance  What is oral appliance therapy?  An oral appliance device fits on your teeth at night like a retainer used after having braces. The device is made by a specialized dentist and requires several visits over 1-2 months before a manufactured device is made to fit your teeth and is adjusted to prevent your sleep apnea. Once an oral device is working properly, snoring should be improved. A home sleep test may be recommended at that time if to determine whether the sleep apnea is adequately treated.       Some things to remember:  -Oral devices are often, but not always, covered by your medical insurance. Be sure to check with your insurance provider.   -If you are referred for oral therapy, you will be given a list of specialized dentists to consider or you may choose to visit the Web site of the American Academy of Dental Sleep Medicine  -Oral devices are less likely to work if you have severe sleep apnea or are extremely overweight.     More detailed information  An oral appliance is a small acrylic device that fits over the upper and lower teeth  (similar to a retainer or a mouth guard). This device slightly moves jaw forward, which moves the base of the tongue forward, opens the airway, improves breathing for effective treat snoring and obstructive sleep apnea in perhaps 7 out of 10 people .   The best working devices are custom-made by a dental device  after a mold is made of the teeth 1, 2, 3.  When is an oral appliance indicated?  Oral appliance therapy is recommended as a first-line treatment for patients with primary snoring, mild sleep apnea, and for patients with moderate sleep apnea who prefer appliance therapy to use of CPAP4, 5. Severity of sleep apnea is determined by sleep testing and is based on the number of respiratory events per hour of sleep.   How successful is oral appliance therapy?  The success rate of oral appliance therapy in patients with mild sleep apnea is 75-80% while in patients with moderate sleep apnea it is 50-70%. The chance of success in patients with severe sleep apnea is 40-50%. The research also shows that oral appliances have a beneficial effect on the cardiovascular health of ORALIA patients at the same magnitude as CPAP therapy7.  Oral appliances should be a second-line treatment in cases of severe sleep apnea, but if not completely successful then a combination therapy utilizing CPAP plus oral appliance therapy may be effective. Oral appliances tend to be effective in a broad range of patients although studies show that the patients who have the highest success are females, younger patients, those with milder disease, and less severe obesity. 3, 6.   Finding a dentist that practices dental sleep medicine  Specific training is available through the American Academy of Dental Sleep Medicine for dentists interested in working in the field of sleep. To find a dentist who is educated in the field of sleep and the use of oral appliances, near you, visit the Web site of the American Academy of Dental Sleep Medicine.    References  1. Hina et al. Objectively measured vs self-reported compliance during oral appliance therapy for sleep-disordered breathing. Chest 2013; 144(5): 7469-4659.  2. Meenu et al. Objective measurement of compliance during oral  appliance therapy for sleep-disordered breathing. Thorax 2013; 68(1): 91-96.  3. Arsalan, et al. Mandibular advancement devices in 620 men and women with ORALIA and snoring: tolerability and predictors of treatment success. Chest 2004; 125: 7755-8008.  4. Los et al. Oral appliances for snoring and ORALIA: a review. Sleep 2006; 29: 244-262.  5. Johnny et al. Oral appliance treatment for ORALIA: an update. J Clin Sleep Med 2014; 10(2): 215-227.  6. Lynn et al. Predictors of OSAH treatment outcome. J Dent Res 2007; 86: 6985-9190.      Weight Loss:    Weight loss is a long-term strategy that may improve sleep apnea in some patients.    Weight management is a personal decision and the decision should be based on your interest and the potential benefits.  If you are interested in exploring weight loss strategies, the following discussion covers the impact on weight loss on sleep apnea and the approaches that may be successful.    Being overweight does not necessarily mean you will have health consequences.  Those who have BMI over 35 or over 27 with existing medical conditions carries greater risk.   Weight loss decreases severity of sleep apnea in most people with obesity. For those with mild obesity who have developed snoring with weight gain, even 15-30 pound weight loss can improve and occasionally eliminate sleep apnea.  Structured and life-long dietary and health habits are necessary to lose weight and keep healthier weight levels.     Though there may be significant health benefits from weight loss, long-term weight loss is very difficult to achieve- studies show success with dietary management in less than 10% of people. In addition, substantial weight loss may require years of dietary control and may be difficult if patients have severe obesity. In these cases, surgical management may be considered.  Finally, older individuals who have tolerated obesity without health complications may be less likely to  benefit from weight loss strategies.      [unfilled]    Surgery:    Surgery for obstructive sleep apnea is considered generally only when other therapies fail to work. Surgery may be discussed with you if you are having a difficult time tolerating CPAP and or when there is an abnormal structure that requires surgical correction.  Nose and throat surgeries often enlarge the airway to prevent collapse.  Most of these surgeries create pain for 1-2 weeks and up to half of the most common surgeries are not effective throughout life.  You should carefully discuss the benefits and drawbacks to surgery with your sleep provider and surgeon to determine if it is the best solution for you.   More information  Surgery for ORALIA is directed at areas that are responsible for narrowing or complete obstruction of the airway during sleep.  There are a wide range of procedures available to enlarge and/or stabilize the airway to prevent blockage of breathing in the three major areas where it can occur: the palate, tongue, and nasal regions.  Successful surgical treatment depends on the accurate identification of the factors responsible for obstructive sleep apnea in each person.  A personalized approach is required because there is no single treatment that works well for everyone.  Because of anatomic variation, consultation with an examination by a sleep surgeon is a critical first step in determining what surgical options are best for each patient.  In some cases, examination during sedation may be recommended in order to guide the selection of procedures.  Patients will be counseled about risks and benefits as well as the typical recovery course after surgery. Surgery is typically not a cure for a person s ORALAI.  However, surgery will often significantly improve one s ORALIA severity (termed  success rate ).  Even in the absence of a cure, surgery will decrease the cardiovascular risk associated with OSA7; improve overall quality of  life8 (sleepiness, functionality, sleep quality, etc).      Palate Procedures:  Patients with ORALIA often have narrowing of their airway in the region of their tonsils and uvula.  The goals of palate procedures are to widen the airway in this region as well as to help the tissues resist collapse.  Modern palate procedure techniques focus on tissue conservation and soft tissue rearrangement, rather than tissue removal.  Often the uvula is preserved in this procedure. Residual sleep apnea is common in patient after pharyngoplasty with an average reduction in sleep apnea events of 33%2.      Tongue Procedures:  ExamWhile patients are awake, the muscles that surround the throat are active and keep this region open for breathing. These muscles relax during sleep, allowing the tongue and other structures to collapse and block breathing.  There are several different tongue procedures available.  Selection of a tongue base procedure depends on characteristics seen on physical exam.  Generally, procedures are aimed at removing bulky tissues in this area or preventing the back of the tongue from falling back during sleep.  Success rates for tongue surgery range from 50-62%3.    Hypoglossal Nerve Stimulation:  Hypoglossal nerve stimulation has recently received approval from the [place] Food and Drug Administration for the treatment of obstructive sleep apnea.  This is based on research showing that the system was safe and effective in treating sleep apnea6.  Results showed that the median AHI score decreased 68%, from 29.3 to 9.0. This therapy uses an implant system that senses breathing patterns and delivers mild stimulation to airway muscles, which keeps the airway open during sleep.  The system consists of three fully implanted components: a small generator (similar in size to a pacemaker), a breathing sensor, and a stimulation lead.  Using a small handheld remote, a patient turns the therapy on before bed and off upon  awakening.    Candidates for this device must be greater than 22 years of age, have moderate to severe ORALIA (AHI between 20-65), BMI less than 32, have tried CPAP/oral appliance without success, and have appropriate upper airway anatomy (determined by a sleep endoscopy performed by Dr. Ferguson).    Hypoglossal Nerve Stimulation Pathway:    The sleep surgeon s office will work with the patient through the insurance prior-authorization process (including communications and appeals).    Nasal Procedures:  Nasal obstruction can interfere with nasal breathing during the day and night.  Studies have shown that relief of nasal obstruction can improve the ability of some patients to tolerate positive airway pressure therapy for obstructive sleep apnea1.  Treatment options include medications such as nasal saline, topical corticosteroid and antihistamine sprays, and oral medications such as antihistamines or decongestants. Non-surgical treatments can include external nasal dilators for selected patients. If these are not successful by themselves, surgery can improve the nasal airway either alone or in combination with these other options.      Combination Procedures:  Combination of surgical procedures and other treatments may be recommended, particularly if patients have more than one area of narrowing or persistent positional disease.  The success rate of combination surgery ranges from 66-80%2,3.    References  Rani LERNER. The Role of the Nose in Snoring and Obstructive Sleep Apnoea: An Update.  Eur Arch Otorhinolaryngol. 2011; 268: 1365-73.   Hetal SM; Sarah JA; Aris JR; Pallanch JF; Martin MB; Glenis SG; Luis A SHAHID. Surgical modifications of the upper airway for obstructive sleep apnea in adults: a systematic review and meta-analysis. SLEEP 2010;33(10):8768-9528. Maria M CANSECO. Hypopharyngeal surgery in obstructive sleep apnea: an evidence-based medicine review.  Arch Otolaryngol Head Neck Surg. 2006  Feb;132(2):206-13.  Ronak YH1, Hany Y, Joel ANJU. The efficacy of anatomically based multilevel surgery for obstructive sleep apnea. Otolaryngol Head Neck Surg. 2003 Oct;129(4):327-35.  Maria M CANSECO, Goldberg A. Hypopharyngeal Surgery in Obstructive Sleep Apnea: An Evidence-Based Medicine Review. Arch Otolaryngol Head Neck Surg. 2006 Feb;132(2):206-13.  Thompson CARMONA et al. Upper-Airway Stimulation for Obstructive Sleep Apnea.  N Engl J Med. 2014 Jan 9;370(2):139-49.  Pegeovanna Y et al. Increased Incidence of Cardiovascular Disease in Middle-aged Men with Obstructive Sleep Apnea. Am J Respir Crit Care Med; 2002 166: 159-165  Valadez EM et al. Studying Life Effects and Effectiveness of Palatopharyngoplasty (SLEEP) study: Subjective Outcomes of Isolated Uvulopalatopharyngoplasty. Otolaryngol Head Neck Surg. 2011; 144: 623-631.

## 2022-05-09 NOTE — PROGRESS NOTES
Sleep Consultation:    Date on this visit: 5/9/2022    Serg De Santiago  is referred by Max Gamino for a sleep consultation.     Primary Physician: Reinaldo Gastelum     Serg De Santiago presents to clinic for evaluation of sleep apnea. \    His medical history is significant for hypertension.     Serg does snore loudly every night. Patient has occasional gasping episodes in sleep. He does have witnessed apneas.They occasionally sleep separately.  Patient sleeps on his back and side. He denies morning dry mouth, morning headaches and restless legs. Serg denies any sleep walking, dream enactment, sleep paralysis and hypnogogic/hypnopompic hallucinations.    Serg goes to sleep at 11:00- 11:30 PM during the week. He wakes up at 8:00 AM. He falls asleep in 10 minutes.  Serg denies difficulty falling asleep.  He wakes up 1 times a night for 10 minutes before falling back to sleep.  Serg wakes up to go to the bathroom.  Patient gets an average of 8 hours of sleep per night.     Serg has difficulty breathing through his nose.      Patient's [place] Sleepiness score 5/24 consistent with no daytime sleepiness.      Serg naps 0 times per week . He takes no inadvertant naps.  He denies closing eyes, dozing and falling asleep while driving. Patient was counseled on the importance of driving while alert, to pull over if drowsy, or nap before getting into the vehicle if sleepy.      He uses 1-2 cups/day of coffee, 1 sodas/day. Last caffeine intake is usually before 2 PM.    Allergies:    No Known Allergies    Medications:    Current Outpatient Medications   Medication Sig Dispense Refill     amLODIPine (NORVASC) 10 MG tablet Take 1 tablet (10 mg) by mouth daily 90 tablet 2     losartan-hydrochlorothiazide (HYZAAR) 100-25 MG tablet Take 1 tablet by mouth daily 90 tablet 2     multivitamin w/minerals (THERA-VIT-M) tablet Take 1 tablet by mouth daily         Problem List:  There are no problems to display for this  "patient.       Social History     Tobacco Use     Smoking status: Current Every Day Smoker     Types: Vaping Device     Smokeless tobacco: Current User     Types: Chew     Tobacco comment: 0.6 nicotine    Substance Use Topics     Alcohol use: Yes     Drug use: Yes     Types: Marijuana     Comment: edibles once in awhile        Physical Examination:  Vitals: /88   Pulse 67   Ht 1.778 m (5' 10\")   Wt 92.5 kg (204 lb)   SpO2 98%   BMI 29.27 kg/m    BMI= Body mass index is 29.27 kg/m .  GENERAL APPEARANCE: healthy, alert and no distress  HENT: oropharynx crowded and uvula elongated  NEURO: mentation intact and speech normal  PSYCH: mentation appears normal and affect normal/bright  Mallampati Class: IV.  Tonsillar Stage: 1  hidden by pillars.    Impression/Plan:    1.  Probable obstructive sleep apnea  2.  Hypertension    Patient is a 61 years old male, with a BMI of 29, medical comorbidity of hypertension, who presents with a history of loud snoring and witnessed apneas. There is an intermediate to high risk for sleep apnea and an overnight sleep study is recommended for assessment.     Plan:     1. Home sleep apnea testing     He will follow up with me in approximately two weeks after his sleep study has been competed to review the results and discuss plan of care.       Polysomnography & HST reviewed.  Limitations of HST reviewed.   Obstructive sleep apnea reviewed.  Complications of untreated sleep apnea were reviewed.    I spent a total of 40 minutes for this appointment on this date of service which include time spent before, during and after the visit for chart review, patient care, counseling and coordination of care.    Dr. Enmanuel Butterfield       CC: Max Gamino            "

## 2022-08-16 ENCOUNTER — TELEPHONE (OUTPATIENT)
Dept: SLEEP MEDICINE | Facility: CLINIC | Age: 62
End: 2022-08-16

## 2022-08-16 NOTE — TELEPHONE ENCOUNTER
Called pt to address concerns was unable to reach. LVM for patient to call back.    Segundo Mendez CNA

## 2022-08-16 NOTE — TELEPHONE ENCOUNTER
Reason for Call:  Other appointment    Detailed comments: patient was scheduled with clinic person Mica Franco for a HST pickup; HST dropoff; and f/u.    The f/u appointment is not at least 2 weeks from HST drop off appointment.  Will results be back in time?    If not, patient needs Mondays for the f/u appointment with Scout Pinon.    Also, patient was given appointment times and not appointment arrival times by this .    Please f/u with clinic person and patient if needed.  Thank you.    Phone Number Patient can be reached at: Home number on file [phone] (home)    Best Time: any    Can we leave a detailed message on this number? YES    Call taken on 8/16/2022 at 9:44 AM by Adali Steven

## 2022-08-18 ENCOUNTER — OFFICE VISIT (OUTPATIENT)
Dept: SLEEP MEDICINE | Facility: CLINIC | Age: 62
End: 2022-08-18
Payer: COMMERCIAL

## 2022-08-18 DIAGNOSIS — R29.818 SUSPECTED SLEEP APNEA: ICD-10-CM

## 2022-08-18 DIAGNOSIS — G47.33 OSA (OBSTRUCTIVE SLEEP APNEA): ICD-10-CM

## 2022-08-18 DIAGNOSIS — R06.81 WITNESSED APNEIC SPELLS: ICD-10-CM

## 2022-08-18 DIAGNOSIS — I10 PRIMARY HYPERTENSION: ICD-10-CM

## 2022-08-18 DIAGNOSIS — R06.83 SNORING: ICD-10-CM

## 2022-08-18 PROCEDURE — G0399 HOME SLEEP TEST/TYPE 3 PORTA: HCPCS | Performed by: INTERNAL MEDICINE

## 2022-08-19 ENCOUNTER — DOCUMENTATION ONLY (OUTPATIENT)
Dept: SLEEP MEDICINE | Facility: CLINIC | Age: 62
End: 2022-08-19
Payer: COMMERCIAL

## 2022-08-19 PROBLEM — G47.33 OSA (OBSTRUCTIVE SLEEP APNEA): Status: ACTIVE | Noted: 2022-08-19

## 2022-08-19 NOTE — PROCEDURES
"HOME SLEEP STUDY INTERPRETATION        Patient: Serg De Santiago  MRN: [id number]  YOB: 1960  Study Date: 2022  PCP/Referring Provider: Reinaldo Gastelum;   Ordering Provider: Enmanuel Butterfield MD       Indications for Home Study: Serg De Santiago is a 61 year old male who presents with symptoms suggestive of obstructive sleep apnea.    Estimated body mass index is 29.27 kg/m  as calculated from the following:    Height as of 22: 1.778 m (5' 10\").    Weight as of 22: 92.5 kg (204 lb).  Total score - [place]: 5 (2022  2:00 PM)  STOP-BAN/8        Data: A full night home sleep study was performed recording the standard physiologic parameters including body position, movement, sound, nasal pressure, thermal oral airflow, chest and abdominal movements with respiratory inductance plethysmography, and oxygen saturation by pulse oximetry. Pulse rate was estimated by oximetry recording. This study was considered adequate based on > 4 hours of quality oximetry and respiratory recording. As specified by the AASM Manual for the Scoring of Sleep and Associated events, version 2.3, Rule VIII.D 1B, 4% oxygen desaturation scoring for hypopneas is used as a standard of care on all home sleep apnea testing.      Analysis Time:  479 minutes      Respiration:   Sleep Associated Hypoxemia: sustained hypoxemia was present. Baseline oxygen saturation was 95%.  Time with saturation less than or equal to 88% was 18.3 minutes. The lowest oxygen saturation was 81%.   Snoring: Snoring was present.  Respiratory events: The home study revealed a presence of 266 obstructive apneas and 139 mixed and central apneas. There were 68 hypopneas resulting in a combined apnea/hypopnea index [AHI] of 59.2 events per hour.  AHI was 66.1 per hour supine, N/A per hour prone, 38.9 per hour on left side, and 60.6 per hour on right side.   Pattern: Excluding events noted above, respiratory rate and pattern was " Normal.      Position: Percent of time spent: supine - 44.2%, prone - 0%, on left - 17.4%, on right - 38.4%.      Heart Rate: By pulse oximetry normal rate was noted.       Assessment:     Severe obstructive sleep apnea.    Sleep associated hypoxemia was present.    Recommendations:    CPAP therapy is recommended for severe obstructive sleep apnea.  Consider auto titrating CPAP therapy with a pressure range of 5 to 15 cm H2O with appropriate clinical follow-up to monitor therapy.    If CPAP is not tolerated, alternate therapy options can include either oral appliance therapy through dental sleep medicine or evaluation for upper airway surgery through ENT surgery.    Suggest optimizing sleep hygiene and avoiding sleep deprivation.    Weight management.      Diagnosis Code(s): Obstructive Sleep Apnea G47.33, Hypoxemia G47.36    Enmanuel Butterfield MD, August 19, 2022   Diplomate, American Board of Psychiatry and Neurology, Sleep Medicine

## 2022-08-19 NOTE — PROGRESS NOTES
This HSAT was performed using a Noxturnal T3 device which recorded snore, sound, movement activity, body position, nasal pressure, oronasal thermal airflow, pulse, oximetry and both chest and abdominal respiratory effort. HSAT data was restricted to the time patient states they were in bed.     HSAT was scored using 1B 4% hypopnea rule.     HST AHI (Non-PAT): 59.2  Snoring was reported as loud.  Time with SpO2 below 89% was 18.3 minutes.   Overall signal quality was good     Pt will follow up with sleep provider to determine appropriate therapy.     HST POST-STUDY QUESTIONNAIRE    1. What time did you go to bed?  10:30 p.m.  2. How long do you think it took to fall asleep?  30 min  3. What time did you wake up to start the day?  7 a.m.  4. Did you get up during the night at all?  yes  5. If you woke up, do you remember approximately what time(s)? 1:30, 4:40 6:30  6. Did you have any difficulty with the equipment?  No  7. Did you us any type of treatment with this study?  None  8. Was the head of the bed elevated? No  9. Did you sleep in a recliner?  No  10. Did you stop using CPAP at least 3 days before this test?  NA  11. Any other information you'd like us to know? No

## 2022-08-29 ENCOUNTER — OFFICE VISIT (OUTPATIENT)
Dept: SLEEP MEDICINE | Facility: CLINIC | Age: 62
End: 2022-08-29
Payer: COMMERCIAL

## 2022-08-29 VITALS
DIASTOLIC BLOOD PRESSURE: 77 MMHG | BODY MASS INDEX: 28.35 KG/M2 | WEIGHT: 198 LBS | HEIGHT: 70 IN | OXYGEN SATURATION: 95 % | SYSTOLIC BLOOD PRESSURE: 119 MMHG | HEART RATE: 76 BPM

## 2022-08-29 DIAGNOSIS — G47.33 OSA (OBSTRUCTIVE SLEEP APNEA): Primary | ICD-10-CM

## 2022-08-29 PROCEDURE — 99214 OFFICE O/P EST MOD 30 MIN: CPT | Performed by: INTERNAL MEDICINE

## 2022-08-29 NOTE — PROGRESS NOTES
Sleep Study Follow-Up Visit:    Date on this visit: 8/29/2022    Serg De Santiago comes in today for follow-up of his home sleep study done on 8/18/2022 at the [facility] for possible sleep apnea.    Analysis Time:  479 minutes        Respiration:   Sleep Associated Hypoxemia: sustained hypoxemia was present. Baseline oxygen saturation was 95%.  Time with saturation less than or equal to 88% was 18.3 minutes. The lowest oxygen saturation was 81%.   Snoring: Snoring was present.  Respiratory events: The home study revealed a presence of 266 obstructive apneas and 139 mixed and central apneas. There were 68 hypopneas resulting in a combined apnea/hypopnea index [AHI] of 59.2 events per hour.  AHI was 66.1 per hour supine, N/A per hour prone, 38.9 per hour on left side, and 60.6 per hour on right side.   Pattern: Excluding events noted above, respiratory rate and pattern was Normal.        Position: Percent of time spent: supine - 44.2%, prone - 0%, on left - 17.4%, on right - 38.4%.        Heart Rate: By pulse oximetry normal rate was noted.         Assessment:     Severe obstructive sleep apnea.    Sleep associated hypoxemia was present.    These findings were reviewed with patient.     Past medical/surgical history, family history, social history, medications and allergies were reviewed.      Problem List:  Patient Active Problem List    Diagnosis Date Noted     ORALIA (obstructive sleep apnea) 08/19/2022     Priority: Medium     Severe ORALIA          Impression/Plan:    1.  Severe obstructive sleep apnea    Patient was counseled in detail regarding severe obstructive sleep apnea, consequences of untreated disease and management options.  CPAP therapy is recommended for severe disease and patient opts to start treatment.    Plan:     - Start auto titrating CPAP therapy with a pressure range of 5 to 15 cm H2O.  -Follow-up in 1 to 2 months after starting treatment    I spent a total of 30 minutes for  this appointment on this date of service which include time spent before, during and after the visit for chart review, patient care, counseling and coordination of care.    Dr. Enmanuel Butterfield     CC: Reinaldo Gastelum

## 2022-08-29 NOTE — NURSING NOTE
"Chief Complaint   Patient presents with     Study Results       Initial /77   Pulse 76   Ht 1.778 m (5' 10\")   Wt 89.8 kg (198 lb)   SpO2 95%   BMI 28.41 kg/m   Estimated body mass index is 28.41 kg/m  as calculated from the following:    Height as of this encounter: 1.778 m (5' 10\").    Weight as of this encounter: 89.8 kg (198 lb).    Medication Reconciliation: complete    ESS:3    Segundo Mendez CNA      "

## 2022-09-12 ENCOUNTER — TELEPHONE (OUTPATIENT)
Dept: SLEEP MEDICINE | Facility: CLINIC | Age: 62
End: 2022-09-12

## 2022-09-12 DIAGNOSIS — G47.33 OSA (OBSTRUCTIVE SLEEP APNEA): Primary | ICD-10-CM

## 2022-09-12 NOTE — TELEPHONE ENCOUNTER
Left message for patient to call [facility] s main line at [phone] to schedule CPAP setup appointment.

## 2022-09-13 ENCOUNTER — TELEPHONE (OUTPATIENT)
Dept: SLEEP MEDICINE | Facility: CLINIC | Age: 62
End: 2022-09-13

## 2022-09-13 DIAGNOSIS — G47.33 OSA (OBSTRUCTIVE SLEEP APNEA): Primary | ICD-10-CM

## 2022-09-18 ENCOUNTER — HEALTH MAINTENANCE LETTER (OUTPATIENT)
Age: 62
End: 2022-09-18

## 2022-10-03 ENCOUNTER — DOCUMENTATION ONLY (OUTPATIENT)
Dept: SLEEP MEDICINE | Facility: CLINIC | Age: 62
End: 2022-10-03

## 2022-10-03 DIAGNOSIS — G47.33 OSA (OBSTRUCTIVE SLEEP APNEA): Primary | ICD-10-CM

## 2022-10-03 NOTE — PROGRESS NOTES
Patient was offered choice of vendor and chose [facility].  Patient Serg De Santiago was set up at [facility]  on October 3, 2022. Patient received a Resmed Airsense 11 Pressures were set at 5-15 cm H2O.   Patient s ramp is 5 cm H2O for Auto and FLEX/EPR is EPR, 2.  Patient received a Resmed Mask name: Airfit N30i Nasal mask size Standard, heated tubing and heated humidifier.  Patient does not need to meet compliance. Patient has a follow up on 1/30/2023 with Dr. Butterfield.    Pearl Huggins

## 2022-10-06 ENCOUNTER — DOCUMENTATION ONLY (OUTPATIENT)
Dept: SLEEP MEDICINE | Facility: CLINIC | Age: 62
End: 2022-10-06

## 2022-10-06 DIAGNOSIS — G47.33 OSA (OBSTRUCTIVE SLEEP APNEA): Primary | ICD-10-CM

## 2022-10-06 NOTE — PROGRESS NOTES
3 day Sleep therapy management telephone visit    Diagnostic AHI:  59.2 HST    Confirmed with patient at time of call- Yes Patient is still interested in STM service       Subjective measures:  Patient reports CPAP is going well. He finds it much more comfortable than his CPAP he used years ago. We reviewed cleaning instructions      Order settings:  CPAP MIN CPAP MAX   5 cm H2O 15 cm H2O       Device settings:  CPAP MIN CPAP MAX EPR RESMED SOFT RESPONSE SETTING   5 cm  H20 15 cm  H20 2 OFF       Compliance 100 %    Assessment: Nightly usage over four hours      Action plan: Patient to have 14 day STM visit. Patient has a follow up visit scheduled:   yes within 31-90 days of set up    Replacement device: Yes  STM ordered by provider: No     Total time spent on accessing and  interpreting remote patient PAP therapy data  10 minutes    Total time spent counseling, coaching  and reviewing PAP therapy data with patient  4 minutes    [id number] no

## 2022-10-19 ENCOUNTER — DOCUMENTATION ONLY (OUTPATIENT)
Dept: SLEEP MEDICINE | Facility: CLINIC | Age: 62
End: 2022-10-19
Payer: COMMERCIAL

## 2022-10-19 DIAGNOSIS — G47.33 OSA (OBSTRUCTIVE SLEEP APNEA): Primary | ICD-10-CM

## 2022-10-19 NOTE — PROGRESS NOTES
14  DAY STM VISIT    Diagnostic AHI:  59.2 HST    Subjective measures: Patient reports that CPAP is going well. He has been getting good scores on the [employer] [place]ka. He doesn't feel much different but he is pleased to use CPAP for his heart health.       Assessment: Pt meeting objective benchmarks.  Patient meeting subjective benchmarks.     Action plan: pt to have 30 day STM visit.      Device type: Auto-CPAP    PAP settings:  CPAP MIN CPAP MAX 95TH % PRESSURE EPR RESMED SOFT RESPONSE SETTING   5.0 cm  H20 15.0 cm  H20 10.2 cm  H20  TWO OFF     Mask type:  Per patient choice    Objective measures: 14 day rolling measures   COMPLIANCE LEAK AHI AVERAGE USE IN MINUTES   100 % 16.96 7.73 505   GOAL >70% GOAL < 24 LPM GOAL <5 GOAL >240          Total time spent on accessing and interpreting remote patient PAP therapy data  10 minutes    Total time spent counseling, coaching  and reviewing PAP therapy data with patient  7 minutes    [id number]  [id number]  no (3 day STM)

## 2022-11-04 ENCOUNTER — DOCUMENTATION ONLY (OUTPATIENT)
Dept: SLEEP MEDICINE | Facility: CLINIC | Age: 62
End: 2022-11-04
Payer: COMMERCIAL

## 2022-11-04 DIAGNOSIS — G47.33 OSA (OBSTRUCTIVE SLEEP APNEA): Primary | ICD-10-CM

## 2022-11-04 NOTE — PROGRESS NOTES
30 DAY STM VISIT    Diagnostic AHI: 59.2  HST    Data only recheck     Assessment: Pt meeting objective benchmarks.     Action plan: pt to have 6 month STM visit  Patient has scheduled a follow up visit with Dr. Butterfield on 1/30/2023.   Device type: Auto-CPAP  PAP settings: CPAP min 5.0 cm  H20     CPAP max 15.0 cm  H20    95th% pressure 9.5 cm  H20      RESMED EPR level Setting: TWO    RESMED Soft response setting:  OFF  Mask type:  Per patient choice  Objective measures: 14 day rolling measures      Compliance  85 %      Leak  15.32 lpm  last  upload      AHI 4.41   last  upload      Average number of minutes 441      Objective measure goal  Compliance   Goal >70%  Leak   Goal < 24 lpm  AHI  Goal < 5  Usage  Goal >240        Total time spent on accessing and interpreting remote patient PAP therapy data  10 minutes    Total time spent counseling, coaching  and reviewing PAP therapy data with patient  1 minutes     [id number] this call  [id number] no  at 3 or 14 day [facility]

## 2022-12-05 DIAGNOSIS — I10 ESSENTIAL HYPERTENSION: ICD-10-CM

## 2022-12-08 RX ORDER — LOSARTAN POTASSIUM AND HYDROCHLOROTHIAZIDE 25; 100 MG/1; MG/1
TABLET ORAL
Qty: 90 TABLET | Refills: 0 | Status: SHIPPED | OUTPATIENT
Start: 2022-12-08 | End: 2023-03-01

## 2022-12-08 RX ORDER — AMLODIPINE BESYLATE 10 MG/1
TABLET ORAL
Qty: 90 TABLET | Refills: 0 | Status: SHIPPED | OUTPATIENT
Start: 2022-12-08 | End: 2023-03-01

## 2022-12-08 NOTE — TELEPHONE ENCOUNTER
Prescription approved per [facility] Refill Protocol.  Alex Alvarez RN  [facility] Triage Nurse

## 2023-02-06 ENCOUNTER — OFFICE VISIT (OUTPATIENT)
Dept: FAMILY MEDICINE | Facility: CLINIC | Age: 63
End: 2023-02-06
Payer: COMMERCIAL

## 2023-02-06 VITALS
TEMPERATURE: 97.7 F | SYSTOLIC BLOOD PRESSURE: 116 MMHG | HEIGHT: 70 IN | OXYGEN SATURATION: 98 % | RESPIRATION RATE: 16 BRPM | HEART RATE: 59 BPM | BODY MASS INDEX: 29.78 KG/M2 | DIASTOLIC BLOOD PRESSURE: 74 MMHG | WEIGHT: 208 LBS

## 2023-02-06 DIAGNOSIS — F17.210 SMOKES WITH GREATER THAN 30 PACK YEAR HISTORY: ICD-10-CM

## 2023-02-06 DIAGNOSIS — Z12.5 SCREENING FOR PROSTATE CANCER: ICD-10-CM

## 2023-02-06 DIAGNOSIS — I10 ESSENTIAL HYPERTENSION: ICD-10-CM

## 2023-02-06 DIAGNOSIS — Z00.00 HEALTHCARE MAINTENANCE: Primary | ICD-10-CM

## 2023-02-06 LAB
ALBUMIN SERPL BCG-MCNC: 4.4 G/DL (ref 3.5–5.2)
ALP SERPL-CCNC: 89 U/L (ref 40–129)
ALT SERPL W P-5'-P-CCNC: 22 U/L (ref 10–50)
ANION GAP SERPL CALCULATED.3IONS-SCNC: 13 MMOL/L (ref 7–15)
AST SERPL W P-5'-P-CCNC: 23 U/L (ref 10–50)
BILIRUB SERPL-MCNC: 1 MG/DL
BUN SERPL-MCNC: 19.6 MG/DL (ref 8–23)
CALCIUM SERPL-MCNC: 9.3 MG/DL (ref 8.8–10.2)
CHLORIDE SERPL-SCNC: 103 MMOL/L (ref 98–107)
CHOLEST SERPL-MCNC: 200 MG/DL
CREAT SERPL-MCNC: 1.05 MG/DL (ref 0.67–1.17)
CREAT UR-MCNC: 89.2 MG/DL
DEPRECATED HCO3 PLAS-SCNC: 24 MMOL/L (ref 22–29)
ERYTHROCYTE [DISTWIDTH] IN BLOOD BY AUTOMATED COUNT: 12.5 % (ref 10–15)
GFR SERPL CREATININE-BSD FRML MDRD: 80 ML/MIN/1.73M2
GLUCOSE SERPL-MCNC: 98 MG/DL (ref 70–99)
HCT VFR BLD AUTO: 44.2 % (ref 40–53)
HDLC SERPL-MCNC: 57 MG/DL
HGB BLD-MCNC: 15.8 G/DL (ref 13.3–17.7)
LDLC SERPL CALC-MCNC: 126 MG/DL
MCH RBC QN AUTO: 33.6 PG (ref 26.5–33)
MCHC RBC AUTO-ENTMCNC: 35.7 G/DL (ref 31.5–36.5)
MCV RBC AUTO: 94 FL (ref 78–100)
MICROALBUMIN UR-MCNC: <12 MG/L
MICROALBUMIN/CREAT UR: NORMAL MG/G{CREAT}
NONHDLC SERPL-MCNC: 143 MG/DL
PLATELET # BLD AUTO: 346 10E3/UL (ref 150–450)
POTASSIUM SERPL-SCNC: 3.9 MMOL/L (ref 3.4–5.3)
PROT SERPL-MCNC: 6.9 G/DL (ref 6.4–8.3)
PSA SERPL-MCNC: 1.35 NG/ML (ref 0–4.5)
RBC # BLD AUTO: 4.7 10E6/UL (ref 4.4–5.9)
SODIUM SERPL-SCNC: 140 MMOL/L (ref 136–145)
TRIGL SERPL-MCNC: 86 MG/DL
WBC # BLD AUTO: 7.2 10E3/UL (ref 4–11)

## 2023-02-06 PROCEDURE — 36415 COLL VENOUS BLD VENIPUNCTURE: CPT | Performed by: FAMILY MEDICINE

## 2023-02-06 PROCEDURE — 82570 ASSAY OF URINE CREATININE: CPT | Performed by: FAMILY MEDICINE

## 2023-02-06 PROCEDURE — 80061 LIPID PANEL: CPT | Performed by: FAMILY MEDICINE

## 2023-02-06 PROCEDURE — 80053 COMPREHEN METABOLIC PANEL: CPT | Performed by: FAMILY MEDICINE

## 2023-02-06 PROCEDURE — 85027 COMPLETE CBC AUTOMATED: CPT | Performed by: FAMILY MEDICINE

## 2023-02-06 PROCEDURE — 82043 UR ALBUMIN QUANTITATIVE: CPT | Performed by: FAMILY MEDICINE

## 2023-02-06 PROCEDURE — G0103 PSA SCREENING: HCPCS | Performed by: FAMILY MEDICINE

## 2023-02-06 PROCEDURE — 99396 PREV VISIT EST AGE 40-64: CPT | Performed by: FAMILY MEDICINE

## 2023-02-06 ASSESSMENT — ENCOUNTER SYMPTOMS
DIARRHEA: 0
PALPITATIONS: 0
MYALGIAS: 0
JOINT SWELLING: 0
ABDOMINAL PAIN: 0
FREQUENCY: 0
CHILLS: 0
NERVOUS/ANXIOUS: 0
PARESTHESIAS: 0
CONSTIPATION: 0
EYE PAIN: 0
HEMATOCHEZIA: 0
COUGH: 0
DYSURIA: 0
ARTHRALGIAS: 0
HEMATURIA: 0
HEARTBURN: 0
HEADACHES: 0
NAUSEA: 0
FEVER: 0
WEAKNESS: 0
SHORTNESS OF BREATH: 0
DIZZINESS: 0
SORE THROAT: 0

## 2023-02-06 ASSESSMENT — PAIN SCALES - GENERAL: PAINLEVEL: NO PAIN (0)

## 2023-02-06 NOTE — PROGRESS NOTES
"SUBJECTIVE:   CC: Serg is an 62 year old who presents for preventative health visit.     Patient has been advised of split billing requirements and indicates understanding: Yes  Healthy Habits:     Getting at least 3 servings of Calcium per day:  Yes    Bi-annual eye exam:  NO    Dental care twice a year:  Yes    Sleep apnea or symptoms of sleep apnea:  Sleep apnea    Diet:  Regular (no restrictions)    Frequency of exercise:  1 day/week    Duration of exercise:  Less than 15 minutes    Taking medications regularly:  Yes    Medication side effects:  None    PHQ-2 Total Score: 0    Additional concerns today:  No    Patient states that new insurance will not let him fill the bp medication through the [employer]. Needs to go through \"[employer]\" with [employer]      Today's PHQ-2 Score:   PHQ-2 ( 1999 Pfizer) 2/6/2023   Q1: Little interest or pleasure in doing things 0   Q2: Feeling down, depressed or hopeless 0   PHQ-2 Score 0   PHQ-2 Total Score (12-17 Years)- Positive if 3 or more points; Administer PHQ-A if positive -   Q1: Little interest or pleasure in doing things Not at all   Q2: Feeling down, depressed or hopeless Not at all   PHQ-2 Score 0           Social History     Tobacco Use     Smoking status: Former     Types: Vaping Device     Smokeless tobacco: Former     Types: Chew     Tobacco comments:     0.6 nicotine    Substance Use Topics     Alcohol use: Yes     If you drink alcohol do you typically have >3 drinks per day or >7 drinks per week? No    Alcohol Use 2/6/2023   Prescreen: >3 drinks/day or >7 drinks/week? No   Prescreen: >3 drinks/day or >7 drinks/week? -   No flowsheet data found.    Last PSA:   PSA   Date Value Ref Range Status   02/08/2021 1.42 0 - 4 ug/L Final     Comment:     Assay Method:  Chemiluminescence using Siemens Vista analyzer     Prostate Specific Antigen Screen   Date Value Ref Range Status   02/14/2022 1.55 0.00 - 4.00 ug/L Final       Reviewed orders with patient. Reviewed health " maintenance and updated orders accordingly - Yes  BP Readings from Last 3 Encounters:   02/06/23 116/74   08/29/22 119/77   05/09/22 132/88    Wt Readings from Last 3 Encounters:   02/06/23 94.3 kg (208 lb)   08/29/22 89.8 kg (198 lb)   05/09/22 92.5 kg (204 lb)                  Patient Active Problem List   Diagnosis     ORALIA (obstructive sleep apnea)     No past surgical history on file.    Social History     Tobacco Use     Smoking status: Former     Types: Vaping Device     Smokeless tobacco: Former     Types: Chew     Tobacco comments:     0.6 nicotine    Substance Use Topics     Alcohol use: Yes     No family history on file.      Current Outpatient Medications   Medication Sig Dispense Refill     amLODIPine (NORVASC) 10 MG tablet TAKE 1 TABLET DAILY 90 tablet 0     losartan-hydrochlorothiazide (HYZAAR) 100-25 MG tablet TAKE 1 TABLET DAILY 90 tablet 0     multivitamin w/minerals (THERA-VIT-M) tablet Take 1 tablet by mouth daily       No Known Allergies  Recent Labs   Lab Test 02/14/22  1013 02/08/21  0744 01/27/20  1015   * 115* 126*   HDL 63 62 58   TRIG 41 92 113   ALT 34 26  --    CR 0.98 1.07 1.12   GFRESTIMATED 88 75 71   GFRESTBLACK  --  87 83   POTASSIUM 3.7 3.5 4.1        Reviewed and updated as needed this visit by clinical staff                  Reviewed and updated as needed this visit by Provider                 No past medical history on file.   No past surgical history on file.    Review of Systems   Constitutional: Negative for chills and fever.   HENT: Negative for congestion, ear pain, hearing loss and sore throat.    Eyes: Negative for pain and visual disturbance.   Respiratory: Negative for cough and shortness of breath.    Cardiovascular: Negative for chest pain, palpitations and peripheral edema.   Gastrointestinal: Negative for abdominal pain, constipation, diarrhea, heartburn, hematochezia and nausea.   Genitourinary: Negative for dysuria, frequency, genital sores, hematuria,  "impotence, penile discharge and urgency.   Musculoskeletal: Negative for arthralgias, joint swelling and myalgias.   Skin: Negative for rash.   Neurological: Negative for dizziness, weakness, headaches and paresthesias.   Psychiatric/Behavioral: Negative for mood changes. The patient is not nervous/anxious.      CONSTITUTIONAL: NEGATIVE for fever, chills, change in weight  INTEGUMENTARY/SKIN: NEGATIVE for worrisome rashes, moles or lesions  EYES: NEGATIVE for vision changes or irritation  ENT: NEGATIVE for ear, mouth and throat problems  RESP: NEGATIVE for significant cough or SOB  CV: NEGATIVE for chest pain, palpitations or peripheral edema  GI: NEGATIVE for nausea, abdominal pain, heartburn, or change in bowel habits   male: negative for dysuria, hematuria, decreased urinary stream, erectile dysfunction, urethral discharge  MUSCULOSKELETAL: NEGATIVE for significant arthralgias or myalgia  NEURO: NEGATIVE for weakness, dizziness or paresthesias  PSYCHIATRIC: NEGATIVE for changes in mood or affect    OBJECTIVE:   /74   Pulse 59   Temp 97.7  F (36.5  C) (Temporal)   Resp 16   Ht 1.778 m (5' 10\")   Wt 94.3 kg (208 lb)   SpO2 98%   BMI 29.84 kg/m      Physical Exam  GENERAL: healthy, alert and no distress  EYES: Eyes grossly normal to inspection, PERRL and conjunctivae and sclerae normal  HENT: ear canals and TM's normal, nose and mouth without ulcers or lesions  NECK: no adenopathy, no asymmetry, masses, or scars and thyroid normal to palpation  RESP: lungs clear to auscultation - no rales, rhonchi or wheezes  CV: regular rate and rhythm, normal S1 S2, no S3 or S4, no murmur, click or rub, no peripheral edema and peripheral pulses strong  ABDOMEN: soft, nontender, no hepatosplenomegaly, no masses and bowel sounds normal  MS: no gross musculoskeletal defects noted, no edema  SKIN: no suspicious lesions or rashes  NEURO: Normal strength and tone, mentation intact and speech normal        ASSESSMENT/PLAN: " "      ICD-10-CM    1. Healthcare maintenance  Z00.00 CBC with platelets     Comprehensive metabolic panel (BMP + Alb, Alk Phos, ALT, AST, Total. Bili, TP)     Lipid panel reflex to direct LDL Fasting     PSA, screen     Albumin Random Urine Quantitative with Creat Ratio     CBC with platelets     Comprehensive metabolic panel (BMP + Alb, Alk Phos, ALT, AST, Total. Bili, TP)     Lipid panel reflex to direct LDL Fasting     PSA, screen     Albumin Random Urine Quantitative with Creat Ratio      2. Screening for prostate cancer  Z12.5 PSA, screen     PSA, screen      3. Essential hypertension  I10 Comprehensive metabolic panel (BMP + Alb, Alk Phos, ALT, AST, Total. Bili, TP)     Lipid panel reflex to direct LDL Fasting     Albumin Random Urine Quantitative with Creat Ratio     Comprehensive metabolic panel (BMP + Alb, Alk Phos, ALT, AST, Total. Bili, TP)     Lipid panel reflex to direct LDL Fasting     Albumin Random Urine Quantitative with Creat Ratio      4. Smokes with greater than 30 pack year history  F17.210 CT Chest Lung Cancer Scrn Low Dose wo          Patient has been advised of split billing requirements and indicates understanding: Yes      COUNSELING:   Reviewed preventive health counseling, as reflected in patient instructions      BMI:   Estimated body mass index is 29.84 kg/m  as calculated from the following:    Height as of this encounter: 1.778 m (5' 10\").    Weight as of this encounter: 94.3 kg (208 lb).   Weight management plan: Discussed healthy diet and exercise guidelines      He reports that he has quit smoking. His smoking use included vaping device. He has quit using smokeless tobacco.  His smokeless tobacco use included chew.            Max Gamino MD  [facility]  "

## 2023-02-13 ENCOUNTER — ANCILLARY PROCEDURE (OUTPATIENT)
Dept: CT IMAGING | Facility: CLINIC | Age: 63
End: 2023-02-13
Attending: FAMILY MEDICINE
Payer: COMMERCIAL

## 2023-02-13 DIAGNOSIS — F17.210 SMOKES WITH GREATER THAN 30 PACK YEAR HISTORY: ICD-10-CM

## 2023-02-13 PROCEDURE — 71271 CT THORAX LUNG CANCER SCR C-: CPT

## 2023-03-05 DIAGNOSIS — I10 ESSENTIAL HYPERTENSION: ICD-10-CM

## 2023-03-06 RX ORDER — LOSARTAN POTASSIUM AND HYDROCHLOROTHIAZIDE 25; 100 MG/1; MG/1
TABLET ORAL
Qty: 90 TABLET | Refills: 2 | Status: SHIPPED | OUTPATIENT
Start: 2023-03-06 | End: 2023-11-27

## 2023-03-06 RX ORDER — AMLODIPINE BESYLATE 10 MG/1
TABLET ORAL
Qty: 90 TABLET | Refills: 2 | Status: SHIPPED | OUTPATIENT
Start: 2023-03-06 | End: 2023-11-24

## 2023-11-24 DIAGNOSIS — I10 ESSENTIAL HYPERTENSION: ICD-10-CM

## 2023-11-24 RX ORDER — AMLODIPINE BESYLATE 10 MG/1
10 TABLET ORAL DAILY
Qty: 30 TABLET | Refills: 8 | Status: SHIPPED | OUTPATIENT
Start: 2023-11-24 | End: 2024-02-26

## 2023-11-26 DIAGNOSIS — I10 ESSENTIAL HYPERTENSION: ICD-10-CM

## 2023-11-27 RX ORDER — LOSARTAN POTASSIUM AND HYDROCHLOROTHIAZIDE 25; 100 MG/1; MG/1
1 TABLET ORAL DAILY
Qty: 90 TABLET | Refills: 0 | Status: SHIPPED | OUTPATIENT
Start: 2023-11-27 | End: 2024-02-26

## 2024-01-05 NOTE — TELEPHONE ENCOUNTER
Lab department aware to collect all morning labs at 7am 1 hr prior to medication cortrosyn injection due at 8am.    Medication is being filled for 1 time refill only due to:  Patient needs to be seen because it has been more than one year since last visit.

## 2024-02-22 SDOH — HEALTH STABILITY: PHYSICAL HEALTH: ON AVERAGE, HOW MANY MINUTES DO YOU ENGAGE IN EXERCISE AT THIS LEVEL?: 30 MIN

## 2024-02-22 SDOH — HEALTH STABILITY: PHYSICAL HEALTH: ON AVERAGE, HOW MANY DAYS PER WEEK DO YOU ENGAGE IN MODERATE TO STRENUOUS EXERCISE (LIKE A BRISK WALK)?: 2 DAYS

## 2024-02-22 ASSESSMENT — SOCIAL DETERMINANTS OF HEALTH (SDOH): HOW OFTEN DO YOU GET TOGETHER WITH FRIENDS OR RELATIVES?: ONCE A WEEK

## 2024-02-24 DIAGNOSIS — I10 ESSENTIAL HYPERTENSION: ICD-10-CM

## 2024-02-26 ENCOUNTER — OFFICE VISIT (OUTPATIENT)
Dept: FAMILY MEDICINE | Facility: CLINIC | Age: 64
End: 2024-02-26
Payer: COMMERCIAL

## 2024-02-26 VITALS
WEIGHT: 209 LBS | DIASTOLIC BLOOD PRESSURE: 72 MMHG | BODY MASS INDEX: 29.26 KG/M2 | TEMPERATURE: 97.3 F | RESPIRATION RATE: 20 BRPM | OXYGEN SATURATION: 97 % | HEIGHT: 71 IN | SYSTOLIC BLOOD PRESSURE: 104 MMHG | HEART RATE: 71 BPM

## 2024-02-26 DIAGNOSIS — I10 ESSENTIAL HYPERTENSION: ICD-10-CM

## 2024-02-26 DIAGNOSIS — Z00.00 HEALTH MAINTENANCE EXAMINATION: Primary | ICD-10-CM

## 2024-02-26 DIAGNOSIS — Z12.5 SCREENING FOR PROSTATE CANCER: ICD-10-CM

## 2024-02-26 LAB
ALBUMIN SERPL BCG-MCNC: 4.6 G/DL (ref 3.5–5.2)
ALP SERPL-CCNC: 90 U/L (ref 40–150)
ALT SERPL W P-5'-P-CCNC: 26 U/L (ref 0–70)
ANION GAP SERPL CALCULATED.3IONS-SCNC: 11 MMOL/L (ref 7–15)
AST SERPL W P-5'-P-CCNC: 23 U/L (ref 0–45)
BILIRUB SERPL-MCNC: 0.8 MG/DL
BUN SERPL-MCNC: 21.1 MG/DL (ref 8–23)
CALCIUM SERPL-MCNC: 9.9 MG/DL (ref 8.8–10.2)
CHLORIDE SERPL-SCNC: 103 MMOL/L (ref 98–107)
CHOLEST SERPL-MCNC: 219 MG/DL
CREAT SERPL-MCNC: 1.03 MG/DL (ref 0.67–1.17)
CREAT UR-MCNC: 64.6 MG/DL
DEPRECATED HCO3 PLAS-SCNC: 26 MMOL/L (ref 22–29)
EGFRCR SERPLBLD CKD-EPI 2021: 82 ML/MIN/1.73M2
ERYTHROCYTE [DISTWIDTH] IN BLOOD BY AUTOMATED COUNT: 12.5 % (ref 10–15)
FASTING STATUS PATIENT QL REPORTED: ABNORMAL
GLUCOSE SERPL-MCNC: 99 MG/DL (ref 70–99)
HCT VFR BLD AUTO: 45.7 % (ref 40–53)
HDLC SERPL-MCNC: 63 MG/DL
HGB BLD-MCNC: 16.2 G/DL (ref 13.3–17.7)
LDLC SERPL CALC-MCNC: 142 MG/DL
MCH RBC QN AUTO: 33.2 PG (ref 26.5–33)
MCHC RBC AUTO-ENTMCNC: 35.4 G/DL (ref 31.5–36.5)
MCV RBC AUTO: 94 FL (ref 78–100)
MICROALBUMIN UR-MCNC: <12 MG/L
MICROALBUMIN/CREAT UR: NORMAL MG/G{CREAT}
NONHDLC SERPL-MCNC: 156 MG/DL
PLATELET # BLD AUTO: 391 10E3/UL (ref 150–450)
POTASSIUM SERPL-SCNC: 3.4 MMOL/L (ref 3.4–5.3)
PROT SERPL-MCNC: 7.5 G/DL (ref 6.4–8.3)
PSA SERPL DL<=0.01 NG/ML-MCNC: 1.82 NG/ML (ref 0–4.5)
RBC # BLD AUTO: 4.88 10E6/UL (ref 4.4–5.9)
SODIUM SERPL-SCNC: 140 MMOL/L (ref 135–145)
TRIGL SERPL-MCNC: 70 MG/DL
WBC # BLD AUTO: 6.1 10E3/UL (ref 4–11)

## 2024-02-26 PROCEDURE — 82570 ASSAY OF URINE CREATININE: CPT | Performed by: FAMILY MEDICINE

## 2024-02-26 PROCEDURE — 36415 COLL VENOUS BLD VENIPUNCTURE: CPT | Performed by: FAMILY MEDICINE

## 2024-02-26 PROCEDURE — 80053 COMPREHEN METABOLIC PANEL: CPT | Performed by: FAMILY MEDICINE

## 2024-02-26 PROCEDURE — 82043 UR ALBUMIN QUANTITATIVE: CPT | Performed by: FAMILY MEDICINE

## 2024-02-26 PROCEDURE — G0103 PSA SCREENING: HCPCS | Performed by: FAMILY MEDICINE

## 2024-02-26 PROCEDURE — 90471 IMMUNIZATION ADMIN: CPT | Performed by: FAMILY MEDICINE

## 2024-02-26 PROCEDURE — 80061 LIPID PANEL: CPT | Performed by: FAMILY MEDICINE

## 2024-02-26 PROCEDURE — 90715 TDAP VACCINE 7 YRS/> IM: CPT | Performed by: FAMILY MEDICINE

## 2024-02-26 PROCEDURE — 85027 COMPLETE CBC AUTOMATED: CPT | Performed by: FAMILY MEDICINE

## 2024-02-26 PROCEDURE — 99396 PREV VISIT EST AGE 40-64: CPT | Mod: 25 | Performed by: FAMILY MEDICINE

## 2024-02-26 RX ORDER — LOSARTAN POTASSIUM AND HYDROCHLOROTHIAZIDE 25; 100 MG/1; MG/1
1 TABLET ORAL DAILY
Qty: 90 TABLET | Refills: 3 | Status: SHIPPED | OUTPATIENT
Start: 2024-02-26

## 2024-02-26 RX ORDER — AMLODIPINE BESYLATE 10 MG/1
10 TABLET ORAL DAILY
Qty: 90 TABLET | Refills: 3 | Status: SHIPPED | OUTPATIENT
Start: 2024-02-26

## 2024-02-26 NOTE — PROGRESS NOTES
"        Preventive Care Visit  [facility] YUDELKA Gamino MD, Family Medicine  Feb 26, 2024    Assessment & Plan     Health maintenance examination    - CBC with platelets; Future  - Comprehensive metabolic panel (BMP + Alb, Alk Phos, ALT, AST, Total. Bili, TP); Future  - Lipid panel reflex to direct LDL Fasting; Future  - PSA, screen; Future  - Albumin Random Urine Quantitative with Creat Ratio; Future    Screening for prostate cancer    - PSA, screen; Future    Essential hypertension    - Albumin Random Urine Quantitative with Creat Ratio; Future  - amLODIPine (NORVASC) 10 MG tablet; Take 1 tablet (10 mg) by mouth daily    Patient has been advised of split billing requirements and indicates understanding: Yes      BMI  Estimated body mass index is 29.56 kg/m  as calculated from the following:    Height as of this encounter: 1.791 m (5' 10.5\").    Weight as of this encounter: 94.8 kg (209 lb).   Weight management plan: Discussed healthy diet and exercise guidelines    Counseling  Appropriate preventive services were discussed with this patient, including applicable screening as appropriate for fall prevention, nutrition, physical activity, Tobacco-use cessation, weight loss and cognition.  Checklist reviewing preventive services available has been given to the patient.  Reviewed patient's diet, addressing concerns and/or questions.   He is at risk for lack of exercise and has been provided with information to increase physical activity for the benefit of his well-being.       FUTURE APPOINTMENTS:       - Follow-up visit in 1 year for CPE    Subjective   Serg is a 63 year old, presenting for the following:  Physical        2/26/2024    11:21 AM   Additional Questions   Roomed by Amaal        Health Care Directive  Patient does not have a Health Care Directive or Living Will: Discussed advance care planning with patient; however, patient declined at this time.    HPI        2/22/2024 "   General Health   How would you rate your overall physical health? Good   Feel stress (tense, anxious, or unable to sleep) Not at all         2/22/2024   Nutrition   Three or more servings of calcium each day? Yes   Diet: Regular (no restrictions)   How many servings of fruit and vegetables per day? (!) 2-3   How many sweetened beverages each day? 0-1         2/22/2024   Exercise   Days per week of moderate/strenous exercise 2 days   Average minutes spent exercising at this level 30 min   (!) EXERCISE CONCERN      2/22/2024   Social Factors   Frequency of gathering with friends or relatives Once a week   Worry food won't last until get money to buy more No   Food not last or not have enough money for food? No   Do you have housing?  Yes   Are you worried about losing your housing? No   Lack of transportation? No   Unable to get utilities (heat,electricity)? No         2/22/2024   Fall Risk   Fallen 2 or more times in the past year? No   Trouble with walking or balance? No          2/22/2024   Dental   Dentist two times every year? Yes         2/22/2024   TB Screening   Were you born outside of [place]?  No         Today's PHQ-2 Score:       2/26/2024     8:14 AM   PHQ-2 ( 1999 [employer])   Q1: Little interest or pleasure in doing things 0   Q2: Feeling down, depressed or hopeless 0   PHQ-2 Score 0   Q1: Little interest or pleasure in doing things Not at all   Q2: Feeling down, depressed or hopeless Not at all   PHQ-2 Score 0           2/22/2024   Substance Use   Alcohol more than 3/day or more than 7/wk No   Do you use any other substances recreationally? No     Social History     Tobacco Use    Smoking status: Passive Smoke Exposure - Never Smoker    Smokeless tobacco: Current     Types: Chew    Tobacco comments:     0.6 nicotine    Substance Use Topics    Alcohol use: Yes    Drug use: Not Currently     Types: Marijuana     Comment: edibles once in awhile            2/22/2024   STI Screening   New sexual partner(s) since  last STI/HIV test? No   Last PSA:   PSA   Date Value Ref Range Status   02/08/2021 1.42 0 - 4 ug/L Final     Comment:     Assay Method:  Chemiluminescence using Siemens Vista analyzer     Prostate Specific Antigen Screen   Date Value Ref Range Status   02/06/2023 1.35 0.00 - 4.50 ng/mL Final   02/14/2022 1.55 0.00 - 4.00 ug/L Final     ASCVD Risk   The 10-year ASCVD risk score (Dale ROWE, et al., 2019) is: 8.2%    Values used to calculate the score:      Age: 63 years      Sex: Male      Is Non- : No      Diabetic: No      Tobacco smoker: No      Systolic Blood Pressure: 104 mmHg      Is BP treated: Yes      HDL Cholesterol: 57 mg/dL      Total Cholesterol: 200 mg/dL           Reviewed and updated as needed this visit by Provider                    Past Medical History:   Diagnosis Date    Hypertension 2020    on meds and CPAP     No past surgical history on file.  BP Readings from Last 3 Encounters:   02/26/24 104/72   02/06/23 116/74   08/29/22 119/77    Wt Readings from Last 3 Encounters:   02/26/24 94.8 kg (209 lb)   02/06/23 94.3 kg (208 lb)   08/29/22 89.8 kg (198 lb)                  Patient Active Problem List   Diagnosis    ORALIA (obstructive sleep apnea)     No past surgical history on file.    Social History     Tobacco Use    Smoking status: Passive Smoke Exposure - Never Smoker    Smokeless tobacco: Current     Types: Chew    Tobacco comments:     0.6 nicotine    Substance Use Topics    Alcohol use: Yes     Family History   Problem Relation Age of Onset    Breast Cancer Mother     Other Cancer Father         Pancreas         Current Outpatient Medications   Medication Sig Dispense Refill    amLODIPine (NORVASC) 10 MG tablet Take 1 tablet (10 mg) by mouth daily 90 tablet 3    losartan-hydrochlorothiazide (HYZAAR) 100-25 MG tablet TAKE 1 TABLET BY MOUTH EVERY DAY 90 tablet 3    multivitamin w/minerals (THERA-VIT-M) tablet Take 1 tablet by mouth daily       No Known  "Allergies  Recent Labs   Lab Test 02/06/23  1047 02/14/22  1013 02/08/21  0744 01/27/20  1015   * 110* 115* 126*   HDL 57 63 62 58   TRIG 86 41 92 113   ALT 22 34 26  --    CR 1.05 0.98 1.07 1.12   GFRESTIMATED 80 88 75 71   GFRESTBLACK  --   --  87 83   POTASSIUM 3.9 3.7 3.5 4.1          Review of Systems  Constitutional, HEENT, cardiovascular, pulmonary, GI, , musculoskeletal, neuro, skin, endocrine and psych systems are negative, except as otherwise noted.     Objective    Exam  /72   Pulse 71   Temp 97.3  F (36.3  C) (Temporal)   Resp 20   Ht 1.791 m (5' 10.5\")   Wt 94.8 kg (209 lb)   SpO2 97%   BMI 29.56 kg/m     Estimated body mass index is 29.56 kg/m  as calculated from the following:    Height as of this encounter: 1.791 m (5' 10.5\").    Weight as of this encounter: 94.8 kg (209 lb).    Physical Exam  GENERAL: alert and no distress  EYES: Eyes grossly normal to inspection, PERRL and conjunctivae and sclerae normal  HENT: ear canals and TM's normal, nose and mouth without ulcers or lesions  NECK: no adenopathy, no asymmetry, masses, or scars  RESP: lungs clear to auscultation - no rales, rhonchi or wheezes  CV: regular rate and rhythm, normal S1 S2, no S3 or S4, no murmur, click or rub, no peripheral edema  ABDOMEN: soft, nontender, no hepatosplenomegaly, no masses and bowel sounds normal  MS: no gross musculoskeletal defects noted, no edema  SKIN: no suspicious lesions or rashes  NEURO: Normal strength and tone, mentation intact and speech normal  BACK: no CVA tenderness, no paralumbar tenderness  PSYCH: mentation appears normal, affect normal/bright      Signed Electronically by: Max Gamino MD    "

## 2024-09-19 ENCOUNTER — VIRTUAL VISIT (OUTPATIENT)
Dept: SLEEP MEDICINE | Facility: CLINIC | Age: 64
End: 2024-09-19
Payer: COMMERCIAL

## 2024-09-19 VITALS — BODY MASS INDEX: 29.26 KG/M2 | HEIGHT: 71 IN | WEIGHT: 209 LBS

## 2024-09-19 DIAGNOSIS — G47.33 OSA (OBSTRUCTIVE SLEEP APNEA): Primary | ICD-10-CM

## 2024-09-19 PROCEDURE — 99213 OFFICE O/P EST LOW 20 MIN: CPT | Mod: 95 | Performed by: INTERNAL MEDICINE

## 2024-09-19 ASSESSMENT — PAIN SCALES - GENERAL: PAINLEVEL: NO PAIN (0)

## 2024-09-19 NOTE — NURSING NOTE
Current patient location: [address] [place] [address] [place] [address]    Is the patient currently in the state of [place]? YES    Visit mode:VIDEO    If the visit is dropped, the patient can be reconnected by: VIDEO VISIT: Text to cell phone:   Telephone Information:   Mobile [phone]       Will anyone else be joining the visit? NO  (If patient encounters technical issues they should call [phone]    How would you like to obtain your AVS? MyChart    Are changes needed to the allergy or medication list? No    Are refills needed on medications prescribed by this physician? NO    Rooming Documentation:  Questionnaire(s) completed      Reason for visit: RITCHIE POWERSF

## 2024-09-19 NOTE — PROGRESS NOTES
Virtual Visit Details    Type of service:  Video Visit     Originating Location (pt. 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     Home sleep test on 8/18/2022 at a weight of 204 pounds showed an apnea-hypopnea index of 59.2/h. AHI was 66.1 per hour supine, N/A per hour prone, 38.9 per hour on left side, and 60.6 per hour on right side. Baseline oxygen saturation was 95%.  Time with saturation less than or equal to 88% was 18.3 minutes. The lowest oxygen saturation was 81%.     Do you use a CPAP Machine at home: Yes  Overall, on a scale of 0-10 how would you rate your CPAP (0 poor, 10 great): 9  Is your mask comfortable: Yes  If not, why:    Is you mask leaking: No  If yes, where do you feel it:    How many night per week does the mask leak (0-7):    Do you notice snoring with mask on: No  Do you notice gasping arousals with mask on: No  Are you having significant oral or nasal dryness: No  Is the pressure setting comfortable: Yes  In not, why:    What type of mask do you use: Nasal Pillow  What is your typical bedtime: 11pm  How long does it take you to go to sleep on PAP therapy: 5 min  What time do you typically get out of bed for the day: 8am  How many hours on average per night are you using PAP therapy: 9  How many hours are you sleeping per night: 8.5  Do you feel well rested in the morning: Yes    ResMed     Auto-PAP 5.0 - 15.0 cmH2O 30 day usage data:    96% of days with > 4 hours of use. 1/30 days with no use.   Average use 502 minutes per day.   95%ile Leak 3.32 L/min.   CPAP 95% pressure 9.4 cm.   AHI 3.28 events per hour.     EPWORTH SLEEPINESS SCALE         9/16/2024     8:38 AM    [place] Sleepiness Scale ( M.W. Johns   "[id number]<br>ESS - [place]/English - Final version - 21 Nov 07 - [facility] [place].)   Sitting and reading Slight chance of dozing   Watching TV Would never doze   Sitting, inactive in a public place (e.g. a theatre or a meeting) Would never doze   As a passenger in a car for an hour without a break Would never doze   Lying down to rest in the afternoon when circumstances permit High chance of dozing   Sitting and talking to someone Would never doze   Sitting quietly after a lunch without alcohol Slight chance of dozing   In a car, while stopped for a few minutes in traffic Would never doze   Ludlow Score (MC) 5   Ludlow Score (Sleep) 5       INSOMNIA SEVERITY INDEX (JAZLYN)          9/16/2024     8:32 AM   Insomnia Severity Index (JAZLYN)   Difficulty falling asleep 0   Difficulty staying asleep 0   Problems waking up too early 0   How SATISFIED/DISSATISFIED are you with your CURRENT sleep pattern? 0   How NOTICEABLE to others do you think your sleep problem is in terms of impairing the quality of your life? 0   How WORRIED/DISTRESSED are you about your current sleep problem? 0   To what extent do you consider your sleep problem to INTERFERE with your daily functioning (e.g. daytime fatigue, mood, ability to function at work/daily chores, concentration, memory, mood, etc.) CURRENTLY? 0   JAZLYN Total Score 0       Guidelines for Scoring/Interpretation:  Total score categories:  0-7 = No clinically significant insomnia   8-14 = Subthreshold insomnia   15-21 = Clinical insomnia (moderate severity)  22-28 = Clinical insomnia (severe)  Used via courtesy of www.adSageth.va.gov with permission from Alan Pacheco PhD., [facility]      Problem List:  Patient Active Problem List    Diagnosis Date Noted    ORALIA (obstructive sleep apnea) 08/19/2022     Priority: Medium     Severe ORALIA            Ht 1.803 m (5' 11\")   Wt 94.8 kg (209 lb)   BMI 29.15 kg/m      Impression/Plan:     Severe obstructive sleep apnea.     Patient " is using CPAP regularly and continues to benefit from treatment.  I reviewed  download data and graphs from his device for last 30 days.  Regular compliance and normal residual AHI demonstrated, confirming adequate treatment of sleep apnea.    Plan:    Continue auto CPAP therapy with a pressure range of 5 to 15 cm H2O    Serg De Santiago will follow up in about 2 year(s).     I am the single focal point of care for a condition that requires longitudinal relationship and personalized care for condition(s) specified within this medical record.     Electronically signed by Dr. Enmanuel Butterfield, Diplomate, Sleep Medicine, ABPN       CC:  Max Gamino,

## 2024-09-20 NOTE — PATIENT INSTRUCTIONS
Your Body mass index is 29.15 kg/m .  Weight management is a personal decision.  If you are interested in exploring weight loss strategies, the following discussion covers the approaches that may be successful. Body mass index (BMI) is one way to tell whether you are at a healthy weight, overweight, or obese. It measures your weight in relation to your height.  A BMI of 18.5 to 24.9 is in the healthy range. A person with a BMI of 25 to 29.9 is considered overweight, and someone with a BMI of 30 or greater is considered obese. More than two-thirds of [place] adults are considered overweight or obese.  Being overweight or obese increases the risk for further weight gain. Excess weight may lead to heart disease and diabetes.  Creating and following plans for healthy eating and physical activity may help you improve your health.  Weight control is part of healthy lifestyle and includes exercise, emotional health, and healthy eating habits. Careful eating habits lifelong are the mainstay of weight control. Though there are significant health benefits from weight loss, long-term weight loss with diet alone may be very difficult to achieve- studies show long-term success with dietary management in less than 10% of people. Attaining a healthy weight may be especially difficult to achieve in those with severe obesity. In some cases, medications, devices and surgical management might be considered.  What can you do?  If you are overweight or obese and are interested in methods for weight loss, you should discuss this with your provider.   Consider reducing daily calorie intake by 500 calories.   Keep a food journal.   Avoiding skipping meals, consider cutting portions instead.    Diet combined with exercise helps maintain muscle while optimizing fat loss. Strength training is particularly important for building and maintaining muscle mass. Exercise helps reduce stress, increase energy, and improves fitness. Increasing  exercise without diet control, however, may not burn enough calories to loose weight.     Start walking three days a week 10-20 minutes at a time  Work towards walking thirty minutes five days a week   Eventually, increase the speed of your walking for 1-2 minutes at time    In addition, we recommend that you review healthy lifestyles and methods for weight loss available through the National Institutes of Health patient information sites:  http://win.niddk.nih.gov/publications/index.htm    And look into health and wellness programs that may be available through your health insurance provider, employer, local community center, or sharmin club.

## 2025-02-05 DIAGNOSIS — I10 ESSENTIAL HYPERTENSION: ICD-10-CM

## 2025-02-06 RX ORDER — AMLODIPINE BESYLATE 10 MG/1
10 TABLET ORAL DAILY
Qty: 90 TABLET | Refills: 0 | Status: SHIPPED | OUTPATIENT
Start: 2025-02-06

## 2025-02-08 DIAGNOSIS — I10 ESSENTIAL HYPERTENSION: ICD-10-CM

## 2025-02-10 RX ORDER — LOSARTAN POTASSIUM AND HYDROCHLOROTHIAZIDE 25; 100 MG/1; MG/1
1 TABLET ORAL DAILY
Qty: 30 TABLET | Refills: 0 | Status: SHIPPED | OUTPATIENT
Start: 2025-02-10

## 2025-02-27 SDOH — HEALTH STABILITY: PHYSICAL HEALTH: ON AVERAGE, HOW MANY MINUTES DO YOU ENGAGE IN EXERCISE AT THIS LEVEL?: 60 MIN

## 2025-02-27 SDOH — HEALTH STABILITY: PHYSICAL HEALTH: ON AVERAGE, HOW MANY DAYS PER WEEK DO YOU ENGAGE IN MODERATE TO STRENUOUS EXERCISE (LIKE A BRISK WALK)?: 2 DAYS

## 2025-02-27 ASSESSMENT — SOCIAL DETERMINANTS OF HEALTH (SDOH): HOW OFTEN DO YOU GET TOGETHER WITH FRIENDS OR RELATIVES?: ONCE A WEEK

## 2025-03-03 ENCOUNTER — OFFICE VISIT (OUTPATIENT)
Dept: FAMILY MEDICINE | Facility: CLINIC | Age: 65
End: 2025-03-03
Payer: COMMERCIAL

## 2025-03-03 VITALS
WEIGHT: 208 LBS | BODY MASS INDEX: 29.78 KG/M2 | HEIGHT: 70 IN | SYSTOLIC BLOOD PRESSURE: 122 MMHG | TEMPERATURE: 97.3 F | RESPIRATION RATE: 15 BRPM | DIASTOLIC BLOOD PRESSURE: 86 MMHG | HEART RATE: 52 BPM | OXYGEN SATURATION: 99 %

## 2025-03-03 DIAGNOSIS — I10 ESSENTIAL HYPERTENSION: ICD-10-CM

## 2025-03-03 DIAGNOSIS — E78.2 MIXED HYPERLIPIDEMIA: ICD-10-CM

## 2025-03-03 DIAGNOSIS — H61.20 WAX IN EAR: ICD-10-CM

## 2025-03-03 DIAGNOSIS — Z00.00 HEALTH MAINTENANCE EXAMINATION: Primary | ICD-10-CM

## 2025-03-03 DIAGNOSIS — Z12.5 SCREENING FOR PROSTATE CANCER: ICD-10-CM

## 2025-03-03 LAB
ALBUMIN SERPL BCG-MCNC: 4.3 G/DL (ref 3.5–5.2)
ALP SERPL-CCNC: 84 U/L (ref 40–150)
ALT SERPL W P-5'-P-CCNC: 22 U/L (ref 0–70)
ANION GAP SERPL CALCULATED.3IONS-SCNC: 12 MMOL/L (ref 7–15)
AST SERPL W P-5'-P-CCNC: 20 U/L (ref 0–45)
BILIRUB SERPL-MCNC: 0.9 MG/DL
BUN SERPL-MCNC: 25.6 MG/DL (ref 8–23)
CALCIUM SERPL-MCNC: 9.9 MG/DL (ref 8.8–10.4)
CHLORIDE SERPL-SCNC: 105 MMOL/L (ref 98–107)
CHOLEST SERPL-MCNC: 182 MG/DL
CREAT SERPL-MCNC: 1.08 MG/DL (ref 0.67–1.17)
CREAT UR-MCNC: 146 MG/DL
EGFRCR SERPLBLD CKD-EPI 2021: 77 ML/MIN/1.73M2
ERYTHROCYTE [DISTWIDTH] IN BLOOD BY AUTOMATED COUNT: 12 % (ref 10–15)
FASTING STATUS PATIENT QL REPORTED: YES
FASTING STATUS PATIENT QL REPORTED: YES
GLUCOSE SERPL-MCNC: 98 MG/DL (ref 70–99)
HCO3 SERPL-SCNC: 26 MMOL/L (ref 22–29)
HCT VFR BLD AUTO: 42.5 % (ref 40–53)
HDLC SERPL-MCNC: 57 MG/DL
HGB BLD-MCNC: 15.3 G/DL (ref 13.3–17.7)
LDLC SERPL CALC-MCNC: 112 MG/DL
MCH RBC QN AUTO: 33.9 PG (ref 26.5–33)
MCHC RBC AUTO-ENTMCNC: 36 G/DL (ref 31.5–36.5)
MCV RBC AUTO: 94 FL (ref 78–100)
MICROALBUMIN UR-MCNC: 12.9 MG/L
MICROALBUMIN/CREAT UR: 8.84 MG/G CR (ref 0–17)
NONHDLC SERPL-MCNC: 125 MG/DL
PLATELET # BLD AUTO: 330 10E3/UL (ref 150–450)
POTASSIUM SERPL-SCNC: 3.9 MMOL/L (ref 3.4–5.3)
PROT SERPL-MCNC: 7.1 G/DL (ref 6.4–8.3)
PSA SERPL DL<=0.01 NG/ML-MCNC: 1.63 NG/ML (ref 0–4.5)
RBC # BLD AUTO: 4.51 10E6/UL (ref 4.4–5.9)
SODIUM SERPL-SCNC: 143 MMOL/L (ref 135–145)
TRIGL SERPL-MCNC: 67 MG/DL
WBC # BLD AUTO: 5.4 10E3/UL (ref 4–11)

## 2025-03-03 PROCEDURE — 99396 PREV VISIT EST AGE 40-64: CPT | Performed by: FAMILY MEDICINE

## 2025-03-03 PROCEDURE — 36415 COLL VENOUS BLD VENIPUNCTURE: CPT | Performed by: FAMILY MEDICINE

## 2025-03-03 PROCEDURE — 1126F AMNT PAIN NOTED NONE PRSNT: CPT | Performed by: FAMILY MEDICINE

## 2025-03-03 PROCEDURE — 80053 COMPREHEN METABOLIC PANEL: CPT | Performed by: FAMILY MEDICINE

## 2025-03-03 PROCEDURE — 3074F SYST BP LT 130 MM HG: CPT | Performed by: FAMILY MEDICINE

## 2025-03-03 PROCEDURE — 80061 LIPID PANEL: CPT | Performed by: FAMILY MEDICINE

## 2025-03-03 PROCEDURE — G0268 REMOVAL OF IMPACTED WAX MD: HCPCS | Performed by: FAMILY MEDICINE

## 2025-03-03 PROCEDURE — G0103 PSA SCREENING: HCPCS | Performed by: FAMILY MEDICINE

## 2025-03-03 PROCEDURE — 85027 COMPLETE CBC AUTOMATED: CPT | Performed by: FAMILY MEDICINE

## 2025-03-03 PROCEDURE — 99214 OFFICE O/P EST MOD 30 MIN: CPT | Mod: 25 | Performed by: FAMILY MEDICINE

## 2025-03-03 PROCEDURE — 82570 ASSAY OF URINE CREATININE: CPT | Performed by: FAMILY MEDICINE

## 2025-03-03 PROCEDURE — 82043 UR ALBUMIN QUANTITATIVE: CPT | Performed by: FAMILY MEDICINE

## 2025-03-03 PROCEDURE — 3079F DIAST BP 80-89 MM HG: CPT | Performed by: FAMILY MEDICINE

## 2025-03-03 RX ORDER — AMLODIPINE BESYLATE 10 MG/1
10 TABLET ORAL DAILY
Qty: 90 TABLET | Refills: 3 | Status: SHIPPED | OUTPATIENT
Start: 2025-03-03

## 2025-03-03 RX ORDER — LOSARTAN POTASSIUM AND HYDROCHLOROTHIAZIDE 25; 100 MG/1; MG/1
1 TABLET ORAL DAILY
Qty: 90 TABLET | Refills: 3 | Status: SHIPPED | OUTPATIENT
Start: 2025-03-03

## 2025-03-03 ASSESSMENT — PAIN SCALES - GENERAL: PAINLEVEL_OUTOF10: NO PAIN (0)

## 2025-03-03 NOTE — PROGRESS NOTES
"Preventive Care Visit  [facility] YUDELKA Gamino MD, Family Medicine  Mar 3, 2025      Assessment & Plan     Essential hypertension  Has been stable, keep monitoring   - amLODIPine (NORVASC) 10 MG tablet; Take 1 tablet (10 mg) by mouth daily.  - losartan-hydrochlorothiazide (HYZAAR) 100-25 MG tablet; Take 1 tablet by mouth daily.  - Albumin Random Urine Quantitative with Creat Ratio; Future    Health maintenance examination    - CBC with platelets; Future  - Comprehensive metabolic panel (BMP + Alb, Alk Phos, ALT, AST, Total. Bili, TP); Future  - Lipid panel reflex to direct LDL Fasting; Future  - PSA, screen; Future  - Albumin Random Urine Quantitative with Creat Ratio; Future    Screening for prostate cancer    - PSA, screen; Future    Mixed hyperlipidemia  Has been fluctuating, will have him to continue working on life style modification including low fat/carb diet with regular exercise   - Comprehensive metabolic panel (BMP + Alb, Alk Phos, ALT, AST, Total. Bili, TP); Future  - Lipid panel reflex to direct LDL Fasting; Future  - Albumin Random Urine Quantitative with Creat Ratio; Future      (H61.20) Wax in ear  Comment: impacted canal on both side, was removed with ENT alligator forceps and flushed with water without complication   Plan: REMOVAL OF IMPACTED WAX MD            Patient has been advised of split billing requirements and indicates understanding: Yes        BMI  Estimated body mass index is 29.64 kg/m  as calculated from the following:    Height as of this encounter: 1.784 m (5' 10.24\").    Weight as of this encounter: 94.3 kg (208 lb).   Weight management plan: Discussed healthy diet and exercise guidelines    Counseling  Appropriate preventive services were addressed with this patient via screening, questionnaire, or discussion as appropriate for fall prevention, nutrition, physical activity, Tobacco-use cessation, social engagement, weight loss and cognition.  Checklist " reviewing preventive services available has been given to the patient.  Reviewed patient's diet, addressing concerns and/or questions.   He is at risk for lack of exercise and has been provided with information to increase physical activity for the benefit of his well-being.       FUTURE APPOINTMENTS:       - Follow-up visit in 1 year     Subjective   Serg is a 64 year old, presenting for the following:  Physical (Fasting)        3/3/2025     9:18 AM   Additional Questions   Roomed by Bernarda HERNANDEZ       Hypertension Follow-up    Do you check your blood pressure regularly outside of the clinic? Yes once a month  Are you following a low salt diet? Yes  Are your blood pressures ever more than 140 on the top number (systolic) OR more   than 90 on the bottom number (diastolic), for example 140/90? No  Hyperlipidemia Follow-Up    Are you regularly taking any medication or supplement to lower your cholesterol?   No  Are you having muscle aches or other side effects that you think could be caused by your cholesterol lowering medication?  No  Advance Care Planning  Patient does not have a Health Care Directive: Patient states has Advance Directive and will bring in a copy to clinic.      2/27/2025   General Health   How would you rate your overall physical health? Good   Feel stress (tense, anxious, or unable to sleep) Not at all         2/27/2025   Nutrition   Three or more servings of calcium each day? Yes   Diet: Regular (no restrictions)   How many servings of fruit and vegetables per day? (!) 2-3   How many sweetened beverages each day? 0-1         2/27/2025   Exercise   Days per week of moderate/strenous exercise 2 days   Average minutes spent exercising at this level 60 min   (!) EXERCISE CONCERN      2/27/2025   Social Factors   Frequency of gathering with friends or relatives Once a week   Worry food won't last until get money to buy more No   Food not last or not have enough money for food? No   Do you  have housing? (Housing is defined as stable permanent housing and does not include staying ouside in a car, in a tent, in an abandoned building, in an overnight shelter, or couch-surfing.) Yes   Are you worried about losing your housing? No   Lack of transportation? No   Unable to get utilities (heat,electricity)? Yes   Want help with housing or utility concern? No   (!) FINANCIAL RESOURCE STRAIN CONCERN      2/27/2025   Fall Risk   Fallen 2 or more times in the past year? No   Trouble with walking or balance? No          2/27/2025   Dental   Dentist two times every year? Yes         2/22/2024   TB Screening   Were you born outside of the [place]? No         Today's PHQ-2 Score:       3/3/2025     9:09 AM   PHQ-2 ( 1999 [employer])   Q1: Little interest or pleasure in doing things 0   Q2: Feeling down, depressed or hopeless 0   PHQ-2 Score 0    Q1: Little interest or pleasure in doing things Not at all   Q2: Feeling down, depressed or hopeless Not at all   PHQ-2 Score 0       Patient-reported           2/27/2025   Substance Use   Alcohol more than 3/day or more than 7/wk No   Do you use any other substances recreationally? No     Social History     Tobacco Use    Smoking status: Passive Smoke Exposure - Never Smoker     Passive exposure: Yes    Smokeless tobacco: Current     Types: Chew    Tobacco comments:     0.6 nicotine    Substance Use Topics    Alcohol use: Yes    Drug use: Not Currently     Types: Marijuana     Comment: edibles once in awhile            2/27/2025   STI Screening   New sexual partner(s) since last STI/HIV test? No   Last PSA:   PSA   Date Value Ref Range Status   02/08/2021 1.42 0 - 4 ug/L Final     Comment:     Assay Method:  Chemiluminescence using Siemens Vista analyzer     Prostate Specific Antigen Screen   Date Value Ref Range Status   02/26/2024 1.82 0.00 - 4.50 ng/mL Final   02/14/2022 1.55 0.00 - 4.00 ug/L Final     ASCVD Risk   The 10-year ASCVD risk score (Dale ROWE, et al., 2019)  is: 11.8%    Values used to calculate the score:      Age: 64 years      Sex: Male      Is Non- : No      Diabetic: No      Tobacco smoker: No      Systolic Blood Pressure: 122 mmHg      Is BP treated: Yes      HDL Cholesterol: 63 mg/dL      Total Cholesterol: 219 mg/dL           Reviewed and updated as needed this visit by Provider                    Past Medical History:   Diagnosis Date    Hypertension 2020    on meds and CPAP     No past surgical history on file.  Labs reviewed in EPIC  BP Readings from Last 3 Encounters:   03/03/25 122/86   02/26/24 104/72   02/06/23 116/74    Wt Readings from Last 3 Encounters:   03/03/25 94.3 kg (208 lb)   09/19/24 94.8 kg (209 lb)   02/26/24 94.8 kg (209 lb)                  Patient Active Problem List   Diagnosis    ORALIA (obstructive sleep apnea)     No past surgical history on file.    Social History     Tobacco Use    Smoking status: Passive Smoke Exposure - Never Smoker     Passive exposure: Yes    Smokeless tobacco: Current     Types: Chew    Tobacco comments:     0.6 nicotine    Substance Use Topics    Alcohol use: Yes     Family History   Problem Relation Age of Onset    Breast Cancer Mother     Other Cancer Father         Pancreas         Current Outpatient Medications   Medication Sig Dispense Refill    amLODIPine (NORVASC) 10 MG tablet Take 1 tablet (10 mg) by mouth daily. 90 tablet 3    losartan-hydrochlorothiazide (HYZAAR) 100-25 MG tablet Take 1 tablet by mouth daily. 90 tablet 3    multivitamin w/minerals (THERA-VIT-M) tablet Take 1 tablet by mouth daily       No Known Allergies  Recent Labs   Lab Test 02/26/24  1201 02/06/23  1047 02/14/22  1013 02/14/22  1013 02/08/21  0744 01/27/20  1015   * 126*  --  110* 115* 126*   HDL 63 57  --  63 62 58   TRIG 70 86  --  41 92 113   ALT 26 22  --  34 26  --    CR 1.03 1.05   < > 0.98 1.07 1.12   GFRESTIMATED 82 80   < > 88 75 71   GFRESTBLACK  --   --   --   --  87 83   POTASSIUM 3.4 3.9   "--  3.7 3.5 4.1    < > = values in this interval not displayed.               Review of Systems  Constitutional, HEENT, cardiovascular, pulmonary, GI, , musculoskeletal, neuro, skin, endocrine and psych systems are negative, except as otherwise noted.     Objective    Exam  /86 (BP Location: Left arm, Patient Position: Sitting, Cuff Size: Adult Large)   Pulse 52   Temp 97.3  F (36.3  C) (Temporal)   Resp 15   Ht 1.784 m (5' 10.24\")   Wt 94.3 kg (208 lb)   SpO2 99%   BMI 29.64 kg/m     Estimated body mass index is 29.64 kg/m  as calculated from the following:    Height as of this encounter: 1.784 m (5' 10.24\").    Weight as of this encounter: 94.3 kg (208 lb).    Physical Exam  GENERAL: alert and no distress  EYES: Eyes grossly normal to inspection, PERRL and conjunctivae and sclerae normal  HENT: normal cephalic/atraumatic, both ears: occluded with wax, nose and mouth without ulcers or lesions, oropharynx clear, and oral mucous membranes moist  NECK: no adenopathy, no asymmetry, masses, or scars  RESP: lungs clear to auscultation - no rales, rhonchi or wheezes  CV: regular rate and rhythm, normal S1 S2, no S3 or S4, no murmur, click or rub, no peripheral edema  ABDOMEN: soft, nontender, no hepatosplenomegaly, no masses and bowel sounds normal  MS: no gross musculoskeletal defects noted, no edema        Signed Electronically by: Max Gamino MD    "

## 2025-03-03 NOTE — NURSING NOTE
Patient identified using two patient identifiers.  Ear exam showing wax occlusion completed by provider.  Solution: warm water was placed in the bilateral ear(s) via irrigation tool: elephant ear.     Cecile العراقي MA on 3/3/2025 at 9:49 AM